# Patient Record
Sex: FEMALE | Race: BLACK OR AFRICAN AMERICAN | NOT HISPANIC OR LATINO | URBAN - METROPOLITAN AREA
[De-identification: names, ages, dates, MRNs, and addresses within clinical notes are randomized per-mention and may not be internally consistent; named-entity substitution may affect disease eponyms.]

---

## 2019-11-13 ENCOUNTER — EMERGENCY (EMERGENCY)
Facility: HOSPITAL | Age: 74
LOS: 1 days | Discharge: HOME | End: 2019-11-13
Attending: EMERGENCY MEDICINE | Admitting: EMERGENCY MEDICINE
Payer: COMMERCIAL

## 2019-11-13 VITALS
HEART RATE: 118 BPM | WEIGHT: 158.95 LBS | OXYGEN SATURATION: 100 % | SYSTOLIC BLOOD PRESSURE: 163 MMHG | RESPIRATION RATE: 18 BRPM | TEMPERATURE: 98 F | HEIGHT: 65 IN | DIASTOLIC BLOOD PRESSURE: 86 MMHG

## 2019-11-13 VITALS
RESPIRATION RATE: 16 BRPM | HEART RATE: 81 BPM | SYSTOLIC BLOOD PRESSURE: 151 MMHG | TEMPERATURE: 98 F | DIASTOLIC BLOOD PRESSURE: 82 MMHG | OXYGEN SATURATION: 100 %

## 2019-11-13 DIAGNOSIS — R10.12 LEFT UPPER QUADRANT PAIN: ICD-10-CM

## 2019-11-13 DIAGNOSIS — R11.0 NAUSEA: ICD-10-CM

## 2019-11-13 LAB
ALBUMIN SERPL ELPH-MCNC: 4.6 G/DL — SIGNIFICANT CHANGE UP (ref 3.3–5)
ALP SERPL-CCNC: 96 U/L — SIGNIFICANT CHANGE UP (ref 40–120)
ALT FLD-CCNC: 31 U/L — SIGNIFICANT CHANGE UP (ref 10–45)
ANION GAP SERPL CALC-SCNC: 13 MMOL/L — SIGNIFICANT CHANGE UP (ref 5–17)
APPEARANCE UR: CLEAR — SIGNIFICANT CHANGE UP
AST SERPL-CCNC: 37 U/L — SIGNIFICANT CHANGE UP (ref 10–40)
BASOPHILS # BLD AUTO: 0.04 K/UL — SIGNIFICANT CHANGE UP (ref 0–0.2)
BASOPHILS NFR BLD AUTO: 0.8 % — SIGNIFICANT CHANGE UP (ref 0–2)
BILIRUB SERPL-MCNC: 0.3 MG/DL — SIGNIFICANT CHANGE UP (ref 0.2–1.2)
BILIRUB UR-MCNC: NEGATIVE — SIGNIFICANT CHANGE UP
BUN SERPL-MCNC: 13 MG/DL — SIGNIFICANT CHANGE UP (ref 7–23)
CALCIUM SERPL-MCNC: 10.4 MG/DL — SIGNIFICANT CHANGE UP (ref 8.4–10.5)
CHLORIDE SERPL-SCNC: 105 MMOL/L — SIGNIFICANT CHANGE UP (ref 96–108)
CO2 SERPL-SCNC: 24 MMOL/L — SIGNIFICANT CHANGE UP (ref 22–31)
COLOR SPEC: YELLOW — SIGNIFICANT CHANGE UP
CREAT SERPL-MCNC: 0.98 MG/DL — SIGNIFICANT CHANGE UP (ref 0.5–1.3)
DIFF PNL FLD: NEGATIVE — SIGNIFICANT CHANGE UP
EOSINOPHIL # BLD AUTO: 0.06 K/UL — SIGNIFICANT CHANGE UP (ref 0–0.5)
EOSINOPHIL NFR BLD AUTO: 1.2 % — SIGNIFICANT CHANGE UP (ref 0–6)
EXTRA BLUE TOP TUBE: SIGNIFICANT CHANGE UP
EXTRA SST TUBE: SIGNIFICANT CHANGE UP
GLUCOSE SERPL-MCNC: 233 MG/DL — HIGH (ref 70–99)
GLUCOSE UR QL: NEGATIVE — SIGNIFICANT CHANGE UP
HCT VFR BLD CALC: 43.4 % — SIGNIFICANT CHANGE UP (ref 34.5–45)
HGB BLD-MCNC: 14.4 G/DL — SIGNIFICANT CHANGE UP (ref 11.5–15.5)
IMM GRANULOCYTES NFR BLD AUTO: 0.2 % — SIGNIFICANT CHANGE UP (ref 0–1.5)
KETONES UR-MCNC: NEGATIVE — SIGNIFICANT CHANGE UP
LACTATE SERPL-SCNC: 1.9 MMOL/L — SIGNIFICANT CHANGE UP (ref 0.5–2)
LACTATE SERPL-SCNC: 3.4 MMOL/L — HIGH (ref 0.5–2)
LEUKOCYTE ESTERASE UR-ACNC: NEGATIVE — SIGNIFICANT CHANGE UP
LIDOCAIN IGE QN: 46 U/L — SIGNIFICANT CHANGE UP (ref 7–60)
LYMPHOCYTES # BLD AUTO: 1.81 K/UL — SIGNIFICANT CHANGE UP (ref 1–3.3)
LYMPHOCYTES # BLD AUTO: 36.1 % — SIGNIFICANT CHANGE UP (ref 13–44)
MCHC RBC-ENTMCNC: 30.8 PG — SIGNIFICANT CHANGE UP (ref 27–34)
MCHC RBC-ENTMCNC: 33.2 GM/DL — SIGNIFICANT CHANGE UP (ref 32–36)
MCV RBC AUTO: 92.9 FL — SIGNIFICANT CHANGE UP (ref 80–100)
MONOCYTES # BLD AUTO: 0.5 K/UL — SIGNIFICANT CHANGE UP (ref 0–0.9)
MONOCYTES NFR BLD AUTO: 10 % — SIGNIFICANT CHANGE UP (ref 2–14)
NEUTROPHILS # BLD AUTO: 2.6 K/UL — SIGNIFICANT CHANGE UP (ref 1.8–7.4)
NEUTROPHILS NFR BLD AUTO: 51.7 % — SIGNIFICANT CHANGE UP (ref 43–77)
NITRITE UR-MCNC: NEGATIVE — SIGNIFICANT CHANGE UP
NRBC # BLD: 0 /100 WBCS — SIGNIFICANT CHANGE UP (ref 0–0)
PH UR: 6.5 — SIGNIFICANT CHANGE UP (ref 5–8)
PLATELET # BLD AUTO: 235 K/UL — SIGNIFICANT CHANGE UP (ref 150–400)
POTASSIUM SERPL-MCNC: 3.6 MMOL/L — SIGNIFICANT CHANGE UP (ref 3.5–5.3)
POTASSIUM SERPL-SCNC: 3.6 MMOL/L — SIGNIFICANT CHANGE UP (ref 3.5–5.3)
PROT SERPL-MCNC: 7.8 G/DL — SIGNIFICANT CHANGE UP (ref 6–8.3)
PROT UR-MCNC: NEGATIVE MG/DL — SIGNIFICANT CHANGE UP
RBC # BLD: 4.67 M/UL — SIGNIFICANT CHANGE UP (ref 3.8–5.2)
RBC # FLD: 13.1 % — SIGNIFICANT CHANGE UP (ref 10.3–14.5)
SODIUM SERPL-SCNC: 142 MMOL/L — SIGNIFICANT CHANGE UP (ref 135–145)
SP GR SPEC: <=1.005 — SIGNIFICANT CHANGE UP (ref 1–1.03)
UROBILINOGEN FLD QL: 0.2 E.U./DL — SIGNIFICANT CHANGE UP
WBC # BLD: 5.02 K/UL — SIGNIFICANT CHANGE UP (ref 3.8–10.5)
WBC # FLD AUTO: 5.02 K/UL — SIGNIFICANT CHANGE UP (ref 3.8–10.5)

## 2019-11-13 PROCEDURE — 93010 ELECTROCARDIOGRAM REPORT: CPT | Mod: NC

## 2019-11-13 PROCEDURE — 99284 EMERGENCY DEPT VISIT MOD MDM: CPT | Mod: 25

## 2019-11-13 PROCEDURE — 71045 X-RAY EXAM CHEST 1 VIEW: CPT | Mod: 26

## 2019-11-13 PROCEDURE — 74177 CT ABD & PELVIS W/CONTRAST: CPT

## 2019-11-13 PROCEDURE — 71045 X-RAY EXAM CHEST 1 VIEW: CPT

## 2019-11-13 PROCEDURE — 36415 COLL VENOUS BLD VENIPUNCTURE: CPT

## 2019-11-13 PROCEDURE — 96374 THER/PROPH/DIAG INJ IV PUSH: CPT | Mod: XU

## 2019-11-13 PROCEDURE — 99284 EMERGENCY DEPT VISIT MOD MDM: CPT

## 2019-11-13 PROCEDURE — 83690 ASSAY OF LIPASE: CPT

## 2019-11-13 PROCEDURE — 87086 URINE CULTURE/COLONY COUNT: CPT

## 2019-11-13 PROCEDURE — 81003 URINALYSIS AUTO W/O SCOPE: CPT

## 2019-11-13 PROCEDURE — 83605 ASSAY OF LACTIC ACID: CPT

## 2019-11-13 PROCEDURE — 74177 CT ABD & PELVIS W/CONTRAST: CPT | Mod: 26

## 2019-11-13 PROCEDURE — 85025 COMPLETE CBC W/AUTO DIFF WBC: CPT

## 2019-11-13 PROCEDURE — 93005 ELECTROCARDIOGRAM TRACING: CPT

## 2019-11-13 PROCEDURE — 80053 COMPREHEN METABOLIC PANEL: CPT

## 2019-11-13 RX ORDER — ONDANSETRON 8 MG/1
4 TABLET, FILM COATED ORAL ONCE
Refills: 0 | Status: COMPLETED | OUTPATIENT
Start: 2019-11-13 | End: 2019-11-13

## 2019-11-13 RX ORDER — ACETAMINOPHEN 500 MG
975 TABLET ORAL ONCE
Refills: 0 | Status: COMPLETED | OUTPATIENT
Start: 2019-11-13 | End: 2019-11-13

## 2019-11-13 RX ORDER — SODIUM CHLORIDE 9 MG/ML
1000 INJECTION INTRAMUSCULAR; INTRAVENOUS; SUBCUTANEOUS ONCE
Refills: 0 | Status: COMPLETED | OUTPATIENT
Start: 2019-11-13 | End: 2019-11-13

## 2019-11-13 RX ORDER — IOHEXOL 300 MG/ML
30 INJECTION, SOLUTION INTRAVENOUS ONCE
Refills: 0 | Status: COMPLETED | OUTPATIENT
Start: 2019-11-13 | End: 2019-11-13

## 2019-11-13 RX ORDER — MORPHINE SULFATE 50 MG/1
2 CAPSULE, EXTENDED RELEASE ORAL ONCE
Refills: 0 | Status: DISCONTINUED | OUTPATIENT
Start: 2019-11-13 | End: 2019-11-13

## 2019-11-13 RX ADMIN — ONDANSETRON 4 MILLIGRAM(S): 8 TABLET, FILM COATED ORAL at 05:21

## 2019-11-13 RX ADMIN — SODIUM CHLORIDE 1000 MILLILITER(S): 9 INJECTION INTRAMUSCULAR; INTRAVENOUS; SUBCUTANEOUS at 05:45

## 2019-11-13 RX ADMIN — Medication 975 MILLIGRAM(S): at 05:46

## 2019-11-13 RX ADMIN — IOHEXOL 30 MILLILITER(S): 300 INJECTION, SOLUTION INTRAVENOUS at 05:21

## 2019-11-13 RX ADMIN — SODIUM CHLORIDE 1000 MILLILITER(S): 9 INJECTION INTRAMUSCULAR; INTRAVENOUS; SUBCUTANEOUS at 05:15

## 2019-11-13 NOTE — ED PROVIDER NOTE - CARE PROVIDERS DIRECT ADDRESSES
[email or web address]

## 2019-11-13 NOTE — ED ADULT NURSE NOTE - OBJECTIVE STATEMENT
Pt presents to ED c/o abdominal pain. Pt reports RUQ/R flank pain, dull/achy, intermittent for months per pt, now also with sharp pain to LUQ for the last two days. Pt also reports 5 lbs weight lose due to decreased appetite, brown discharge in underwear "every once in a while" for months, and sharp pain to vaginal area. Pt also reports "cloudy urine" for a long time, has been seen by PMD but not treated, pt denies painful urination. Pt is s/p total hysterectomy in 2010 for uterine CA at [facility], pt states "they told me they took lymph nodes at that time, they also told me I had benign lesions on my kidney, liver, and lungs at that time." Pt reports she woke up this morning around 1 am, felt anxious and diaphoretic, prompted her to present to ED. Denies f/c, CP/SOB, N/V/D. Pt presents in NAD speaking full sentences ambulatory through triage. Pt notably tachycardic in triage, EKG preformed on arrival to bed 3.

## 2019-11-13 NOTE — ED PROVIDER NOTE - PHYSICAL EXAMINATION
GEN: Well appearing, well nourished, awake, alert, oriented to person, place, time/situation and in no apparent distress. NTAF.  ENT: Airway patent, Nasal mucosa clear. Mouth with normal mucosa.  EYES: Clear bilaterally. perrl, eomi  RESPIRATORY: Breathing comfortably with normal RR. No w.c.r  CARDIAC: Regular rate and rhythm  ABDOMEN: Soft, mild TTp right mid abd and LUQ, +bowel sounds, no rebound, rigidity, or guarding. No CVAT b/l.  MSK: Range of motion is not limited, no deformities noted.  NEURO: Alert and oriented x 3. Cn 2-12 intact. Strength 5/5 and sensation intact in all 4 extremities.   Gait normal.   SKIN: Skin normal color for race, warm, dry and intact. No anxious mood and affect. no apparent risk to self or others.

## 2019-11-13 NOTE — ED PROVIDER NOTE - CLINICAL SUMMARY MEDICAL DECISION MAKING FREE TEXT BOX
73F with a h/o ovarian cancer in the past, s/p total hysterectomy on 2010, at that time she was told she had benign lesions in her liver, kidneys and lungs, who p/w right mid abdominal/flank and LUQ pain, sharp intermittent associated nausea and burping, VSS, afebrile, mild TTp on exam, will check labs, urine, lactate, and CT a/p to r/o obstruction, infection, ischemia, malignancy of other acute abd pathology. Pt refused morphine for pain, requests tylenol, she was also given zofran and IVFs. Initial lactate 3.4, will recheck after 2L IVfs.

## 2019-11-13 NOTE — ED PROVIDER NOTE - CARE PROVIDER_API CALL
Nicholas Gallegos)  Internal Medicine  [address] [place] [address]  Phone: [phone]  Fax: [phone]  Follow Up Time:     Jeff Archer)  Medicine  [address], [address]  [place] [address]  Phone: [phone]  Fax: [phone]  Follow Up Time:

## 2019-11-13 NOTE — ED PROVIDER NOTE - PATIENT PORTAL LINK FT
You can access the FollowMyHealth Patient Portal offered by [facility] by registering at the following website: [email or web address]. By joining [employer]’s FollowMyHealth portal, you will also be able to view your health information using other applications (apps) compatible with our system.

## 2019-11-13 NOTE — ED PROVIDER NOTE - OBJECTIVE STATEMENT
73F with a h/o ovarian cancer in the past, s/p total hysterectomy on 2010, at that time she was told she had benign lesions in her liver, kidneys and lungs, who p/w right mid abdominal/flank and LUQ pain, sharp intermittent associated nausea and burping, she has had this pain for 2 years but notes it has become worse in the past few days no f/c, no vomits, no cp/sob, or urinary sx. pt does endorse 5 lb weight loss recently.

## 2019-11-13 NOTE — ED PROVIDER NOTE - NSFOLLOWUPINSTRUCTIONS_ED_ALL_ED_FT
avoid spicy fried foods, eat blend diet, avoid caffeine and alcohol, take pepcid, follow up with pmd or gastroenterologist, return immediately for any worsening symptoms  Abdominal Pain, Adult  Many things can cause belly (abdominal) pain. Most times, belly pain is not dangerous. Many cases of belly pain can be watched and treated at home. Sometimes belly pain is serious, though. Your doctor will try to find the cause of your belly pain.  Follow these instructions at home:  Take over-the-counter and prescription medicines only as told by your doctor. Do not take medicines that help you poop (laxatives) unless told to by your doctor.Drink enough fluid to keep your pee (urine) clear or pale yellow.Watch your belly pain for any changes.Keep all follow-up visits as told by your doctor. This is important.Contact a doctor if:  Your belly pain changes or gets worse.You are not hungry, or you lose weight without trying.You are having trouble pooping (constipated) or have watery poop (diarrhea) for more than 2–3 days.You have pain when you pee or poop.Your belly pain wakes you up at night.Your pain gets worse with meals, after eating, or with certain foods.You are throwing up and cannot keep anything down.You have a fever.Get help right away if:  Your pain does not go away as soon as your doctor says it should.You cannot stop throwing up.Your pain is only in areas of your belly, such as the right side or the left lower part of the belly.You have bloody or black poop, or poop that looks like tar.You have very bad pain, cramping, or bloating in your belly.You have signs of not having enough fluid or water in your body (dehydration), such as:  Dark pee, very little pee, or no pee.Cracked lips.Dry mouth.Sunken eyes.Sleepiness.Weakness.

## 2019-11-13 NOTE — ED PROVIDER NOTE - PROGRESS NOTE DETAILS
valdo: pt received at sign out from dr martines as pending ct - scan neg for acute pathology, pt nad, sleeping comfortably, lactate neg after ivf, will dc to f/u w/pmd and gi, pt understands and agrees w/plan

## 2019-11-13 NOTE — ED ADULT TRIAGE NOTE - CHIEF COMPLAINT QUOTE
right flank and epigastric pain x2 years, worst today; hx of "benign lesions" on kidneys, liver and lungs

## 2019-11-13 NOTE — ED ADULT NURSE NOTE - NSIMPLEMENTINTERV_GEN_ALL_ED
Implemented All Universal Safety Interventions:  Levels to call system. Call bell, personal items and telephone within reach. Instruct patient to call for assistance. Room bathroom lighting operational. Non-slip footwear when patient is off stretcher. Physically safe environment: no spills, clutter or unnecessary equipment. Stretcher in lowest position, wheels locked, appropriate side rails in place.

## 2019-11-14 LAB
CULTURE RESULTS: SIGNIFICANT CHANGE UP
SPECIMEN SOURCE: SIGNIFICANT CHANGE UP

## 2021-07-08 ENCOUNTER — APPOINTMENT (OUTPATIENT)
Dept: PLASTIC SURGERY | Facility: CLINIC | Age: 76
End: 2021-07-08

## 2021-07-08 ENCOUNTER — OUTPATIENT (OUTPATIENT)
Dept: OUTPATIENT SERVICES | Facility: HOSPITAL | Age: 76
LOS: 1 days | End: 2021-07-08

## 2021-07-08 DIAGNOSIS — Z86.79 PERSONAL HISTORY OF OTHER DISEASES OF THE CIRCULATORY SYSTEM: ICD-10-CM

## 2021-07-08 NOTE — ASSESSMENT
[FreeTextEntry1] : 75F interested in liposuction and correction of buttock ptosis.\par - Explained to patient that liposuction would not address abdominal contour and may leave the patient with sagging/excess skin.  As there is no appreciable rectus diastasis, this abdominal contour is likely related to visceral adiposity which is unlikely to be corrected with liposuction or revision abdominoplasty \par - Buttock ptosis is due to volume loss and loss of skin elasticity.  this could possibly be addressed with fat transfer, but patient has poor donor sites to enable harvesting of sufficient fat to correct this degree of buttock ptosis\par \par Patient was explained all the above and agrees with the assessment and plan. Patient invited to call back or make another appointment

## 2021-07-08 NOTE — HISTORY OF PRESENT ILLNESS
[FreeTextEntry1] : 75F PMH hypertension (on 3 antihypertensives), uterine CA s/p ARPITA 2010, abdominoplasty 20 years prior presents with concern for abdominal contour and buttock ptosis.  Patient expresses concern for supraumbilical fullness and left buttock ptosis > right

## 2021-07-08 NOTE — PHYSICAL EXAM
[NI] : Normal [de-identified] : Well-appearing [de-identified] : Well-healed abdominoplasty scar.  No palpable rectus diastasis.  Mild adiposity above umbilicus; however majority of abdominal contour appears to be due to visceral adiposity; skin pinch thickness is ~2-3cm [de-identified] : Left buttock with greater deflation and ptosis compared to right.  Striae visible on bilateral buttocks.  No scars visible.  Area of ptosis and deflation on the left are superior to the gluteal fold

## 2021-07-09 DIAGNOSIS — Z01.89 ENCOUNTER FOR OTHER SPECIFIED SPECIAL EXAMINATIONS: ICD-10-CM

## 2021-11-04 ENCOUNTER — NON-APPOINTMENT (OUTPATIENT)
Age: 76
End: 2021-11-04

## 2021-11-04 ENCOUNTER — APPOINTMENT (OUTPATIENT)
Dept: HEART AND VASCULAR | Facility: CLINIC | Age: 76
End: 2021-11-04
Payer: MEDICARE

## 2021-11-04 VITALS
OXYGEN SATURATION: 98 % | TEMPERATURE: 97.5 F | WEIGHT: 152 LBS | HEIGHT: 64 IN | SYSTOLIC BLOOD PRESSURE: 132 MMHG | HEART RATE: 63 BPM | BODY MASS INDEX: 25.95 KG/M2 | DIASTOLIC BLOOD PRESSURE: 80 MMHG

## 2021-11-04 DIAGNOSIS — E66.3 OVERWEIGHT: ICD-10-CM

## 2021-11-04 DIAGNOSIS — M25.519 PAIN IN UNSPECIFIED SHOULDER: ICD-10-CM

## 2021-11-04 DIAGNOSIS — I10 ESSENTIAL (PRIMARY) HYPERTENSION: ICD-10-CM

## 2021-11-04 DIAGNOSIS — Z78.9 OTHER SPECIFIED HEALTH STATUS: ICD-10-CM

## 2021-11-04 DIAGNOSIS — Z85.42 PERSONAL HISTORY OF MALIGNANT NEOPLASM OF OTHER PARTS OF UTERUS: ICD-10-CM

## 2021-11-04 DIAGNOSIS — R73.03 PREDIABETES.: ICD-10-CM

## 2021-11-04 PROCEDURE — 36415 COLL VENOUS BLD VENIPUNCTURE: CPT

## 2021-11-04 PROCEDURE — 99205 OFFICE O/P NEW HI 60 MIN: CPT | Mod: 25

## 2021-11-04 PROCEDURE — 93000 ELECTROCARDIOGRAM COMPLETE: CPT

## 2021-11-04 RX ORDER — CHROMIUM 200 MCG
TABLET ORAL
Refills: 0 | Status: ACTIVE | COMMUNITY

## 2021-11-04 RX ORDER — NIFEDIPINE 30 MG/1
30 TABLET, EXTENDED RELEASE ORAL
Refills: 0 | Status: ACTIVE | COMMUNITY

## 2021-11-04 RX ORDER — METOPROLOL SUCCINATE 50 MG/1
50 TABLET, EXTENDED RELEASE ORAL
Refills: 0 | Status: ACTIVE | COMMUNITY

## 2021-11-04 RX ORDER — LISINOPRIL 20 MG/1
20 TABLET ORAL
Refills: 0 | Status: ACTIVE | COMMUNITY

## 2021-11-04 RX ORDER — MULTIVIT-MIN/IRON/FOLIC ACID/K 18-600-40
CAPSULE ORAL
Refills: 0 | Status: ACTIVE | COMMUNITY

## 2021-11-04 NOTE — HISTORY OF PRESENT ILLNESS
[FreeTextEntry1] : \par \par 76 y/o female with h/o htn, uterine ca s/p ARPITA/chemo/xrt, predm, overweight who presents for initial evaluation today of new shoulder pain\par \par no cp\par notes sharp pain in shoulder/arm/hand a few months ago \par no sob, orthopnea, pnd, syncope, palpitations, edema, fatigue \par \par \par had chemo x 6 sessions post hysterectomy\par \par \par walk daily\par diet healthy\par lost 10 lbs\par HTN diagnosed in 40's\par \par no pregnancy complications\par no mental health issues\par \par \par PMH/PSH:\par predm\par htn\par uterine ca s/p ARPITA  - chemo/radiation\par abdominoplasty \par overweight\par \par \par MEDS:\par metoprolol succinate 50 mg qd\par nifedipine 30 mg qd\par lisinopril 20 mg qd\par vit C/D\par \par SH:\par no tobacco\par social etoh\par no drugs\par 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

## 2021-11-04 NOTE — PHYSICAL EXAM
[Well Developed] : well developed [Well Nourished] : well nourished [No Acute Distress] : no acute distress [Normal Conjunctiva] : normal conjunctiva [Normal Venous Pressure] : normal venous pressure [No Carotid Bruit] : no carotid bruit [Normal S1, S2] : normal S1, S2 [No Rub] : no rub [No Gallop] : no gallop [Clear Lung Fields] : clear lung fields [Good Air Entry] : good air entry [No Respiratory Distress] : no respiratory distress  [Soft] : abdomen soft [Non Tender] : non-tender [No Masses/organomegaly] : no masses/organomegaly [Normal Bowel Sounds] : normal bowel sounds [Normal Gait] : normal gait [No Edema] : no edema [No Cyanosis] : no cyanosis [No Clubbing] : no clubbing [No Varicosities] : no varicosities [No Rash] : no rash [No Skin Lesions] : no skin lesions [Moves all extremities] : moves all extremities [No Focal Deficits] : no focal deficits [Normal Speech] : normal speech [Alert and Oriented] : alert and oriented [Normal memory] : normal memory [de-identified] : 2/6 flex gilbert

## 2021-11-04 NOTE — DISCUSSION/SUMMARY
[Patient] : the patient [___ Month(s)] : in [unfilled] month(s) [FreeTextEntry1] : \par \par 76 y/o female with h/o htn, predm, uterine ca s/p ARPITA/chemo/xrt, overweight who presents for initial evaluation today of new shoulder pain\par \par -ordered ekg today - nsr, lad, possible lvh, no st/t changes\par -labs ordered today\par -ordered CTA given h/o chemo, new left shoulder pain - consider asa, statin pending results\par -ordered Echo for shoulder pain, h/o chemo and murmur\par -continue BB, CCB, ACE \par -counseled on cvd risk factors\par -monitor A1c for angelica\par -referral to pcp\par -f/up 1-2 months for shoulder pain, htn, cvd risk factors\par \par I have spent 60 minutes reviewing labs, records, tests and discussed cvd risk factors, htn, shoulder appt \par \par

## 2021-11-08 LAB
ALBUMIN SERPL ELPH-MCNC: 4.9 G/DL
ALP BLD-CCNC: 100 U/L
ALT SERPL-CCNC: 36 U/L
ANION GAP SERPL CALC-SCNC: 18 MMOL/L
APPEARANCE: ABNORMAL
AST SERPL-CCNC: 34 U/L
BACTERIA: NEGATIVE
BASOPHILS # BLD AUTO: 0.04 K/UL
BASOPHILS NFR BLD AUTO: 0.7 %
BILIRUB SERPL-MCNC: 0.2 MG/DL
BILIRUBIN URINE: NEGATIVE
BLOOD URINE: NEGATIVE
BUN SERPL-MCNC: 17 MG/DL
CALCIUM OXALATE CRYSTALS: ABNORMAL
CALCIUM SERPL-MCNC: 11 MG/DL
CHLORIDE SERPL-SCNC: 107 MMOL/L
CHOLEST SERPL-MCNC: 233 MG/DL
CO2 SERPL-SCNC: 20 MMOL/L
COLOR: YELLOW
CREAT SERPL-MCNC: 1.05 MG/DL
CREAT SPEC-SCNC: 217 MG/DL
EOSINOPHIL # BLD AUTO: 0.08 K/UL
EOSINOPHIL NFR BLD AUTO: 1.5 %
ESTIMATED AVERAGE GLUCOSE: 120 MG/DL
GLUCOSE QUALITATIVE U: NEGATIVE
GLUCOSE SERPL-MCNC: 95 MG/DL
HBA1C MFR BLD HPLC: 5.8 %
HCT VFR BLD CALC: 45.9 %
HDLC SERPL-MCNC: 68 MG/DL
HGB BLD-MCNC: 15 G/DL
HYALINE CASTS: 2 /LPF
IMM GRANULOCYTES NFR BLD AUTO: 0.2 %
KETONES URINE: NEGATIVE
LDLC SERPL CALC-MCNC: 142 MG/DL
LEUKOCYTE ESTERASE URINE: NEGATIVE
LYMPHOCYTES # BLD AUTO: 2.69 K/UL
LYMPHOCYTES NFR BLD AUTO: 48.8 %
MAGNESIUM SERPL-MCNC: 2.2 MG/DL
MAN DIFF?: NORMAL
MCHC RBC-ENTMCNC: 31.9 PG
MCHC RBC-ENTMCNC: 32.7 GM/DL
MCV RBC AUTO: 97.7 FL
MICROALBUMIN 24H UR DL<=1MG/L-MCNC: 1.5 MG/DL
MICROALBUMIN/CREAT 24H UR-RTO: 7 MG/G
MICROSCOPIC-UA: NORMAL
MONOCYTES # BLD AUTO: 0.5 K/UL
MONOCYTES NFR BLD AUTO: 9.1 %
NEUTROPHILS # BLD AUTO: 2.19 K/UL
NEUTROPHILS NFR BLD AUTO: 39.7 %
NITRITE URINE: NEGATIVE
NONHDLC SERPL-MCNC: 165 MG/DL
PH URINE: 5.5
PLATELET # BLD AUTO: 264 K/UL
POTASSIUM SERPL-SCNC: 4.7 MMOL/L
PROT SERPL-MCNC: 7.8 G/DL
PROTEIN URINE: NEGATIVE
RBC # BLD: 4.7 M/UL
RBC # FLD: 14.2 %
RED BLOOD CELLS URINE: 3 /HPF
SODIUM SERPL-SCNC: 145 MMOL/L
SPECIFIC GRAVITY URINE: 1.02
SQUAMOUS EPITHELIAL CELLS: 11 /HPF
TRIGL SERPL-MCNC: 115 MG/DL
TSH SERPL-ACNC: 0.85 UIU/ML
UROBILINOGEN URINE: NORMAL
WBC # FLD AUTO: 5.51 K/UL
WHITE BLOOD CELLS URINE: 2 /HPF

## 2021-11-15 ENCOUNTER — INPATIENT (INPATIENT)
Facility: HOSPITAL | Age: 76
LOS: 0 days | Discharge: HOME | DRG: 313 | End: 2021-11-16
Attending: INTERNAL MEDICINE | Admitting: INTERNAL MEDICINE
Payer: MEDICARE

## 2021-11-15 VITALS
HEART RATE: 73 BPM | DIASTOLIC BLOOD PRESSURE: 81 MMHG | TEMPERATURE: 98 F | SYSTOLIC BLOOD PRESSURE: 148 MMHG | HEIGHT: 65 IN | WEIGHT: 164.91 LBS | RESPIRATION RATE: 18 BRPM | OXYGEN SATURATION: 99 %

## 2021-11-15 LAB
ALBUMIN SERPL ELPH-MCNC: 4.5 G/DL — SIGNIFICANT CHANGE UP (ref 3.3–5)
ALP SERPL-CCNC: 101 U/L — SIGNIFICANT CHANGE UP (ref 40–120)
ALT FLD-CCNC: 26 U/L — SIGNIFICANT CHANGE UP (ref 10–45)
ANION GAP SERPL CALC-SCNC: 13 MMOL/L — SIGNIFICANT CHANGE UP (ref 5–17)
APTT BLD: 29.9 SEC — SIGNIFICANT CHANGE UP (ref 27.5–35.5)
AST SERPL-CCNC: 37 U/L — SIGNIFICANT CHANGE UP (ref 10–40)
BASOPHILS # BLD AUTO: 0.04 K/UL — SIGNIFICANT CHANGE UP (ref 0–0.2)
BASOPHILS NFR BLD AUTO: 0.7 % — SIGNIFICANT CHANGE UP (ref 0–2)
BILIRUB SERPL-MCNC: 0.2 MG/DL — SIGNIFICANT CHANGE UP (ref 0.2–1.2)
BUN SERPL-MCNC: 17 MG/DL — SIGNIFICANT CHANGE UP (ref 7–23)
CALCIUM SERPL-MCNC: 10.4 MG/DL — SIGNIFICANT CHANGE UP (ref 8.4–10.5)
CHLORIDE SERPL-SCNC: 105 MMOL/L — SIGNIFICANT CHANGE UP (ref 96–108)
CK MB CFR SERPL CALC: 1.4 NG/ML — SIGNIFICANT CHANGE UP (ref 0–6.7)
CK SERPL-CCNC: 101 U/L — SIGNIFICANT CHANGE UP (ref 25–170)
CO2 SERPL-SCNC: 24 MMOL/L — SIGNIFICANT CHANGE UP (ref 22–31)
CREAT SERPL-MCNC: 1.13 MG/DL — SIGNIFICANT CHANGE UP (ref 0.5–1.3)
EOSINOPHIL # BLD AUTO: 0.09 K/UL — SIGNIFICANT CHANGE UP (ref 0–0.5)
EOSINOPHIL NFR BLD AUTO: 1.6 % — SIGNIFICANT CHANGE UP (ref 0–6)
GLUCOSE SERPL-MCNC: 116 MG/DL — HIGH (ref 70–99)
HCT VFR BLD CALC: 41.9 % — SIGNIFICANT CHANGE UP (ref 34.5–45)
HGB BLD-MCNC: 13.8 G/DL — SIGNIFICANT CHANGE UP (ref 11.5–15.5)
IMM GRANULOCYTES NFR BLD AUTO: 0.2 % — SIGNIFICANT CHANGE UP (ref 0–1.5)
INR BLD: 0.93 — SIGNIFICANT CHANGE UP (ref 0.88–1.16)
LYMPHOCYTES # BLD AUTO: 2.92 K/UL — SIGNIFICANT CHANGE UP (ref 1–3.3)
LYMPHOCYTES # BLD AUTO: 51 % — HIGH (ref 13–44)
MCHC RBC-ENTMCNC: 31.4 PG — SIGNIFICANT CHANGE UP (ref 27–34)
MCHC RBC-ENTMCNC: 32.9 GM/DL — SIGNIFICANT CHANGE UP (ref 32–36)
MCV RBC AUTO: 95.4 FL — SIGNIFICANT CHANGE UP (ref 80–100)
MONOCYTES # BLD AUTO: 0.64 K/UL — SIGNIFICANT CHANGE UP (ref 0–0.9)
MONOCYTES NFR BLD AUTO: 11.2 % — SIGNIFICANT CHANGE UP (ref 2–14)
NEUTROPHILS # BLD AUTO: 2.02 K/UL — SIGNIFICANT CHANGE UP (ref 1.8–7.4)
NEUTROPHILS NFR BLD AUTO: 35.3 % — LOW (ref 43–77)
NRBC # BLD: 0 /100 WBCS — SIGNIFICANT CHANGE UP (ref 0–0)
NT-PROBNP SERPL-SCNC: 38 PG/ML — SIGNIFICANT CHANGE UP (ref 0–300)
PLATELET # BLD AUTO: 236 K/UL — SIGNIFICANT CHANGE UP (ref 150–400)
POTASSIUM SERPL-MCNC: 4.2 MMOL/L — SIGNIFICANT CHANGE UP (ref 3.5–5.3)
POTASSIUM SERPL-SCNC: 4.2 MMOL/L — SIGNIFICANT CHANGE UP (ref 3.5–5.3)
PROT SERPL-MCNC: 7.4 G/DL — SIGNIFICANT CHANGE UP (ref 6–8.3)
PROTHROM AB SERPL-ACNC: 11.2 SEC — SIGNIFICANT CHANGE UP (ref 10.6–13.6)
RBC # BLD: 4.39 M/UL — SIGNIFICANT CHANGE UP (ref 3.8–5.2)
RBC # FLD: 13.6 % — SIGNIFICANT CHANGE UP (ref 10.3–14.5)
SARS-COV-2 RNA SPEC QL NAA+PROBE: NEGATIVE — SIGNIFICANT CHANGE UP
SODIUM SERPL-SCNC: 142 MMOL/L — SIGNIFICANT CHANGE UP (ref 135–145)
TROPONIN T SERPL-MCNC: 0.01 NG/ML — SIGNIFICANT CHANGE UP (ref 0–0.01)
WBC # BLD: 5.72 K/UL — SIGNIFICANT CHANGE UP (ref 3.8–10.5)
WBC # FLD AUTO: 5.72 K/UL — SIGNIFICANT CHANGE UP (ref 3.8–10.5)

## 2021-11-15 PROCEDURE — 99285 EMERGENCY DEPT VISIT HI MDM: CPT | Mod: 25

## 2021-11-15 PROCEDURE — 71045 X-RAY EXAM CHEST 1 VIEW: CPT | Mod: 26

## 2021-11-15 PROCEDURE — 93010 ELECTROCARDIOGRAM REPORT: CPT

## 2021-11-15 NOTE — ED ADULT NURSE NOTE - CHIEF COMPLAINT QUOTE
pt co palpitations since this AM. Seen by cardiologist 2 weeks ago and dx with murmur and referred for echo but has not had one yet. Denies Cp, SOB. EKG in prog.

## 2021-11-15 NOTE — ED PROVIDER NOTE - OBJECTIVE STATEMENT
75F with a h/o HTN, HLD, GERD who p/w left sided chest pain off and on associated with some intermittent palpitations since this morning. She noticed that her HR was in the 80s on her home BP monitor and reports it is usually in the 60s. She saw her cardiologist 2 weeks ago who noticed a murmur and recommended "additional tests" which she had not had a chance to have done. She currently denies CP, No f/c, no sob, no leg swelling, no n/v, no abd pain, no ha or neck pain, no dizziness or syncope, no n/t/w in ext. No other complaints.

## 2021-11-15 NOTE — ED ADULT TRIAGE NOTE - CHIEF COMPLAINT QUOTE
Your 18 Month Old  Next Visit:  Next visit: When your child is 1 years old    Here are some tips to help keep your child healthy, safe and happy!  The Department of Health recommends your child see a dentist yearly.  If your child has not received fluoride dental varnish to help prevent early cavities ask your provider about it.   Feeding:  Your child should be off the bottle now.  If your child needs some comfort to get to sleep, let them use a cuddly toy, blanket, or thumb, but not a bottle.   Your toddler should be eating three meals a day, plus one or two healthy snacks.  Are you and your child on WIC (Women, Infants and Children)?  Call to see if you qualify for free food or formula.  Call [facility] at [phone] ([facility]) or [phone] ([facility]).  Safety:  Your child should be in a rear-facing car seat until the age of 2 or until your child reaches the highest weight or height allowed by the car seat s . The car seat should be properly installed in the back seat of all vehicles for every ride.  Some toddlers can unbuckle car seat straps.  Do not start the car until everyone in the car has buckled their seatbelts and stop if your toddler unbuckles.  Constant supervision is necessary.  Your toddler is curious and creative.  Keep your child s environment safe by using safety plugs in all unused electrical outlets so your child can't stick their finger or a toy into the holes.  Also use outlet covers that can fit over plugged-in cords. Place vences at the top and bottom of staircases and guards on windows on the second floor or higher.  Lock away all poisons, cleaning products and medications. Call Poison Help (1-692.747.5167) if you are concerned your child has eaten something harmful.  Have working smoke detectors on every floor. Change the batteries once a year and check to see that it works once a month.    Home Life:  Protect your child from smoke.  If someone in your house is  smoking, your child is smoking too.  Do not allow anyone to smoke in your home.  Don't leave your child with a caretaker who smokes.  Toddlers are rarely ready for toilet training before they are 2 years old.  Some signs that a child may be ready are:     bowel movements occur on a predictable schedule    the diaper is dry for 2 hours     can and will follow instructions     shows an interest in imitating other family members in the bathroom    can tell when their bladder is full or when they are about to have a bowel movement              Help your child brush their teeth at least once a day, ideally at bedtime.  Use a soft nylon-bristle brush.  Use only a small amount of toothpaste with fluoride.    It is best to set rules for screen time (TV/computer/phone) when your child is young.  Some suggestions are:    Turn the TV on for certain programs and then turn it off again.  Don't leave it turned on all the time.     Pick educational programs right for your child's age.      Avoid using screen time as a .      Set clear screen time limits.  Encourage your child to do other activities.    Call Early Childhood Family Education [phone] ([place])/[phone] ([place]) or your local school district for information about classes and groups for parents and children.  Development:  Most children at 18 months can:    put simple clothing on and off     roll a ball back and forth    scribble with a crayon    speak about 15 words    run well       walk upstairs by holding a rail  Give your child:    chances to run, climb and explore    picture books - and read them to your child    toys to put together    praise, hugs, affection  Updated 3/2018   i   pt co palpitations since this AM. Seen by cardiologist 2 weeks ago and dx with murmur and referred for echo but has not had one yet. Denies Cp, SOB. EKG in prog.

## 2021-11-15 NOTE — ED PROVIDER NOTE - CLINICAL SUMMARY MEDICAL DECISION MAKING FREE TEXT BOX
75F with a h/o HTN, HLD, GERD who p/w left sided chest pain off and on associated with some intermittent palpitations since this morning. She noticed that her HR was in the 80s on her home BP monitor and reports it is usually in the 60s. She saw her cardiologist 2 weeks ago who noticed a murmur and recommended "additional tests" which she had not had a chance to have done. She currently denies CP, No f/c, no sob, no leg swelling, no n/v, no abd pain, no ha or neck pain, no dizziness or syncope, no n/t/w in ext. No other complaints.  Pt is well-appearing on exam, VSS, no focal neuro deficits, EKG NSR, no ischemia   Plan for cardiac labs, DD, CXR, anticipate admit for r/o acs.

## 2021-11-15 NOTE — ED ADULT NURSE NOTE - CHPI ED NUR SYMPTOMS NEG
no back pain/no chest pain/no congestion/no diaphoresis/no dizziness/no fever/no nausea/no shortness of breath/no vomiting

## 2021-11-15 NOTE — ED PROVIDER NOTE - PHYSICAL EXAMINATION
GEN: Well appearing, well developed, awake, alert, oriented to person, place, time/situation and in no apparent distress. NTAF  ENT: Airway patent, Nasal mucosa clear. Mouth with normal mucosa.  EYES: Clear bilaterally. PERRL, EOMI  RESPIRATORY: Breathing comfortably with normal RR. No W/C/R, no hypoxia or resp distress.  CARDIAC: Regular rate and rhythm, +ENZO  ABDOMEN: Soft, nontender, +bowel sounds, no rebound, rigidity, or guarding.  MSK: Range of motion is not limited, no deformities noted.  NEURO: Alert and oriented, no focal deficits.  SKIN: Skin normal color for race, warm, dry and intact. No evidence of rash.  PSYCH: Alert and oriented to person, place, time/situation. normal mood and affect. no apparent risk to self or others.

## 2021-11-16 ENCOUNTER — TRANSCRIPTION ENCOUNTER (OUTPATIENT)
Age: 76
End: 2021-11-16

## 2021-11-16 VITALS
RESPIRATION RATE: 17 BRPM | HEART RATE: 57 BPM | SYSTOLIC BLOOD PRESSURE: 130 MMHG | TEMPERATURE: 99 F | OXYGEN SATURATION: 100 % | DIASTOLIC BLOOD PRESSURE: 68 MMHG

## 2021-11-16 DIAGNOSIS — I10 ESSENTIAL (PRIMARY) HYPERTENSION: ICD-10-CM

## 2021-11-16 DIAGNOSIS — Z29.9 ENCOUNTER FOR PROPHYLACTIC MEASURES, UNSPECIFIED: ICD-10-CM

## 2021-11-16 DIAGNOSIS — R07.9 CHEST PAIN, UNSPECIFIED: ICD-10-CM

## 2021-11-16 DIAGNOSIS — E11.9 TYPE 2 DIABETES MELLITUS WITHOUT COMPLICATIONS: ICD-10-CM

## 2021-11-16 DIAGNOSIS — E78.5 HYPERLIPIDEMIA, UNSPECIFIED: ICD-10-CM

## 2021-11-16 DIAGNOSIS — R00.2 PALPITATIONS: ICD-10-CM

## 2021-11-16 DIAGNOSIS — K21.9 GASTRO-ESOPHAGEAL REFLUX DISEASE WITHOUT ESOPHAGITIS: ICD-10-CM

## 2021-11-16 LAB
A1C WITH ESTIMATED AVERAGE GLUCOSE RESULT: 5.7 % — HIGH (ref 4–5.6)
ANION GAP SERPL CALC-SCNC: 9 MMOL/L — SIGNIFICANT CHANGE UP (ref 5–17)
BUN SERPL-MCNC: 12 MG/DL — SIGNIFICANT CHANGE UP (ref 7–23)
CALCIUM SERPL-MCNC: 9.9 MG/DL — SIGNIFICANT CHANGE UP (ref 8.4–10.5)
CHLORIDE SERPL-SCNC: 107 MMOL/L — SIGNIFICANT CHANGE UP (ref 96–108)
CHOLEST SERPL-MCNC: 204 MG/DL — HIGH
CK SERPL-CCNC: 96 U/L — SIGNIFICANT CHANGE UP (ref 25–170)
CO2 SERPL-SCNC: 26 MMOL/L — SIGNIFICANT CHANGE UP (ref 22–31)
COVID-19 SPIKE DOMAIN AB INTERP: POSITIVE
COVID-19 SPIKE DOMAIN ANTIBODY RESULT: >250 U/ML — HIGH
CREAT SERPL-MCNC: 0.91 MG/DL — SIGNIFICANT CHANGE UP (ref 0.5–1.3)
ESTIMATED AVERAGE GLUCOSE: 117 MG/DL — HIGH (ref 68–114)
GLUCOSE SERPL-MCNC: 110 MG/DL — HIGH (ref 70–99)
HCT VFR BLD CALC: 42.3 % — SIGNIFICANT CHANGE UP (ref 34.5–45)
HDLC SERPL-MCNC: 67 MG/DL — SIGNIFICANT CHANGE UP
HGB BLD-MCNC: 14 G/DL — SIGNIFICANT CHANGE UP (ref 11.5–15.5)
LIPID PNL WITH DIRECT LDL SERPL: 122 MG/DL — HIGH
MAGNESIUM SERPL-MCNC: 2.2 MG/DL — SIGNIFICANT CHANGE UP (ref 1.6–2.6)
MCHC RBC-ENTMCNC: 31.1 PG — SIGNIFICANT CHANGE UP (ref 27–34)
MCHC RBC-ENTMCNC: 33.1 GM/DL — SIGNIFICANT CHANGE UP (ref 32–36)
MCV RBC AUTO: 94 FL — SIGNIFICANT CHANGE UP (ref 80–100)
NON HDL CHOLESTEROL: 137 MG/DL — HIGH
NRBC # BLD: 0 /100 WBCS — SIGNIFICANT CHANGE UP (ref 0–0)
PHOSPHATE SERPL-MCNC: 3.5 MG/DL — SIGNIFICANT CHANGE UP (ref 2.5–4.5)
PLATELET # BLD AUTO: 202 K/UL — SIGNIFICANT CHANGE UP (ref 150–400)
POTASSIUM SERPL-MCNC: 4.3 MMOL/L — SIGNIFICANT CHANGE UP (ref 3.5–5.3)
POTASSIUM SERPL-SCNC: 4.3 MMOL/L — SIGNIFICANT CHANGE UP (ref 3.5–5.3)
RBC # BLD: 4.5 M/UL — SIGNIFICANT CHANGE UP (ref 3.8–5.2)
RBC # FLD: 13.6 % — SIGNIFICANT CHANGE UP (ref 10.3–14.5)
SARS-COV-2 IGG+IGM SERPL QL IA: >250 U/ML — HIGH
SARS-COV-2 IGG+IGM SERPL QL IA: POSITIVE
SODIUM SERPL-SCNC: 142 MMOL/L — SIGNIFICANT CHANGE UP (ref 135–145)
TRIGL SERPL-MCNC: 74 MG/DL — SIGNIFICANT CHANGE UP
TROPONIN T SERPL-MCNC: 0.01 NG/ML — SIGNIFICANT CHANGE UP (ref 0–0.01)
TSH SERPL-MCNC: 1.29 UIU/ML — SIGNIFICANT CHANGE UP (ref 0.27–4.2)
WBC # BLD: 4.54 K/UL — SIGNIFICANT CHANGE UP (ref 3.8–10.5)
WBC # FLD AUTO: 4.54 K/UL — SIGNIFICANT CHANGE UP (ref 3.8–10.5)

## 2021-11-16 PROCEDURE — 78452 HT MUSCLE IMAGE SPECT MULT: CPT | Mod: 26

## 2021-11-16 PROCEDURE — 93018 CV STRESS TEST I&R ONLY: CPT

## 2021-11-16 PROCEDURE — 93306 TTE W/DOPPLER COMPLETE: CPT | Mod: 26

## 2021-11-16 PROCEDURE — 93016 CV STRESS TEST SUPVJ ONLY: CPT

## 2021-11-16 PROCEDURE — 99239 HOSP IP/OBS DSCHRG MGMT >30: CPT

## 2021-11-16 RX ORDER — NIFEDIPINE 30 MG
1 TABLET, EXTENDED RELEASE 24 HR ORAL
Qty: 0 | Refills: 0 | DISCHARGE

## 2021-11-16 RX ORDER — LISINOPRIL 2.5 MG/1
1 TABLET ORAL
Qty: 0 | Refills: 0 | DISCHARGE

## 2021-11-16 RX ORDER — NIFEDIPINE 30 MG
30 TABLET, EXTENDED RELEASE 24 HR ORAL DAILY
Refills: 0 | Status: DISCONTINUED | OUTPATIENT
Start: 2021-11-16 | End: 2021-11-16

## 2021-11-16 RX ORDER — FAMOTIDINE 10 MG/ML
20 INJECTION INTRAVENOUS DAILY
Refills: 0 | Status: DISCONTINUED | OUTPATIENT
Start: 2021-11-16 | End: 2021-11-16

## 2021-11-16 RX ORDER — METOPROLOL TARTRATE 50 MG
1 TABLET ORAL
Qty: 0 | Refills: 0 | DISCHARGE

## 2021-11-16 RX ORDER — INFLUENZA VIRUS VACCINE 15; 15; 15; 15 UG/.5ML; UG/.5ML; UG/.5ML; UG/.5ML
0.7 SUSPENSION INTRAMUSCULAR ONCE
Refills: 0 | Status: COMPLETED | OUTPATIENT
Start: 2021-11-16 | End: 2021-11-16

## 2021-11-16 RX ORDER — ASPIRIN/CALCIUM CARB/MAGNESIUM 324 MG
81 TABLET ORAL DAILY
Refills: 0 | Status: DISCONTINUED | OUTPATIENT
Start: 2021-11-16 | End: 2021-11-16

## 2021-11-16 RX ORDER — LISINOPRIL 2.5 MG/1
20 TABLET ORAL DAILY
Refills: 0 | Status: DISCONTINUED | OUTPATIENT
Start: 2021-11-16 | End: 2021-11-16

## 2021-11-16 RX ORDER — METOPROLOL TARTRATE 50 MG
50 TABLET ORAL DAILY
Refills: 0 | Status: DISCONTINUED | OUTPATIENT
Start: 2021-11-16 | End: 2021-11-16

## 2021-11-16 RX ORDER — ERGOCALCIFEROL 1.25 MG/1
1 CAPSULE ORAL
Qty: 0 | Refills: 0 | DISCHARGE

## 2021-11-16 RX ORDER — ACETAMINOPHEN 500 MG
650 TABLET ORAL EVERY 6 HOURS
Refills: 0 | Status: DISCONTINUED | OUTPATIENT
Start: 2021-11-16 | End: 2021-11-16

## 2021-11-16 RX ADMIN — Medication 30 MILLIGRAM(S): at 06:00

## 2021-11-16 RX ADMIN — LISINOPRIL 20 MILLIGRAM(S): 2.5 TABLET ORAL at 06:00

## 2021-11-16 RX ADMIN — FAMOTIDINE 20 MILLIGRAM(S): 10 INJECTION INTRAVENOUS at 15:48

## 2021-11-16 RX ADMIN — Medication 50 MILLIGRAM(S): at 06:01

## 2021-11-16 RX ADMIN — Medication 81 MILLIGRAM(S): at 15:48

## 2021-11-16 NOTE — H&P ADULT - PROBLEM SELECTOR PLAN 1
Admit for tele monitoring, serial enzymes. Echo ordered. NPO for further testing as clinically indicated.  Continue BB, start ASA as tolerated, consider statin.

## 2021-11-16 NOTE — H&P ADULT - NSICDXPASTMEDICALHX_GEN_ALL_CORE_FT
PAST MEDICAL HISTORY:  Diabetes mellitus, type 2     Dyslipidemia     GERD (gastroesophageal reflux disease)     Hypertension     Uterine cancer s/p hysto/xrt/chemo

## 2021-11-16 NOTE — DISCHARGE NOTE PROVIDER - NSDCCPCAREPLAN_GEN_ALL_CORE_FT
PRINCIPAL DISCHARGE DIAGNOSIS  Diagnosis: Chest pain  Assessment and Plan of Treatment: The chest pain you experienced was not due to your heart. You did NOT have a heart attack.  Your labwork was normal. You had a sonogram of your hearst that showed normal pumping function of the heart and normal heart valves. You underwent a stress test that was normal.  Please call to schedule a follow up with Dr Johansen in 1-2 weeks.      SECONDARY DISCHARGE DIAGNOSES  Diagnosis: Hypertension  Assessment and Plan of Treatment: You have a history of elevated blood pressure and you should continue your blood pressure medications as prescribed.

## 2021-11-16 NOTE — H&P ADULT - ASSESSMENT
74yo F with multiple cardiac risk factors c/o CP and palpitations unrelated to activity, several episodes over the past two weeks. Admit for tele monitoring, serial enzymes, echo and further evaluation pending clinical progression.

## 2021-11-16 NOTE — DISCHARGE NOTE PROVIDER - CARE PROVIDER_API CALL
Yue Martinez)  Cardiology  [address], [address]  [place], [place] [address]  Phone: [phone]  Fax: [phone]  Follow Up Time:

## 2021-11-16 NOTE — H&P ADULT - NSHPLABSRESULTS_GEN_ALL_CORE
13.8   5.72  )-----------( 236      ( 15 Nov 2021 21:13 )             41.9     11-15    142  |  105  |  17  ----------------------------<  116<H>  4.2   |  24  |  1.13    Ca    10.4      15 Nov 2021 21:13    TPro  7.4  /  Alb  4.5  /  TBili  0.2  /  DBili  x   /  AST  37  /  ALT  26  /  AlkPhos  101  11-15       PT/INR - ( 15 Nov 2021 21:13 )   PT: 11.2 sec;   INR: 0.93        PTT - ( 15 Nov 2021 21:13 )  PTT:29.9 sec    CARDIAC MARKERS ( 15 Nov 2021 21:13 )  x     / 0.01 ng/mL / 101 U/L / x     / 1.4 ng/mL

## 2021-11-16 NOTE — H&P ADULT - ENDOCRINE
4/23/21, 7:22 AM EDT    DISCHARGE ON [address] [place] [address] day: 10  Location: [facility]01/001-A Reason for admit: Pneumonia [J18.9]   Procedure:   4/17 PICC  4/22 CXR: B/L Pleural Effusions  Barriers to Discharge: Creatinine 2.4, H 8.4, WBC 22.3; monitor. Palliative Care following.  IV Diuresing continued  PCP: CAITY Martinez CNP  Readmission Risk Score: 23%  Patient Goals/Plan/Treatment Preferences: plans home w spouse (who works), [facility] (nsg, therapy, aide, Palliative Care), has right pleuryx drain, RW, WC; follows w [facility]; ambulates 200 feet; [facility], RW, WC negative

## 2021-11-16 NOTE — H&P ADULT - NSICDXPASTSURGICALHX_GEN_ALL_CORE_FT
PAST SURGICAL HISTORY:  H/O abdominoplasty     S/P hysterectomy     S/P ORIF (open reduction internal fixation) fracture right ankleab

## 2021-11-16 NOTE — H&P ADULT - PROBLEM SELECTOR PLAN 5
Not currently tx but does report dyspepsia with ECASA.  Will start pepcid 20mg daily and continue prn.

## 2021-11-16 NOTE — DISCHARGE NOTE PROVIDER - HOSPITAL COURSE
This is a 74 yo F h/o HTN, dyslipidemia (no current tx), “pre-diabetes” and remote h/o uterine CA tx with surgical resection, XRT and Chemo presented to ED c/o chest pain and palpitations that started this morning.  Pt describes 10/10 left-sided chest tightness radiating to left arm and shoulder, waxing and waning but never resolving since awakening this morning.  This is the 4th episode of similar symptoms in the past few weeks.  She saw her cardiologist, Dr. Johansen, approx 2 weeks ago and found with ?new murmur, recommended for outpatient echo and “other tests” which have not yet been performed.  Today symptoms were associated with palpitations and sense of racing heart so she presented to ED. Per pt her baseline heartrate is 50s/60s and today was 86 with symptoms (on home BP monitor).  She denies SOB, n/v/abd pain, near/syncope or any symptoms to suggest CHF. She reports a "normal" R&LHC and stress test at [facility] approx 25 years ago. In the ED, EKG showed NSR with lateral TWI, no prior for comparison. CXR without I/E.  Cardiac enzymes negative x 1. Covid negative.  Of Note: pt lists "intolerance" to Aspirin; further discussion shows pt suffers from dyspepsia/GI distress with aspirin, but has well-tolerated daily ECASA 81mg in the past and is willing and agreeable to take daily for extended period if recommended. CE negative x2 and chest pain free. ECHO 11/16/21: Normal left and right ventricular size and systolic function. EF 60-65% grade I diastolc dysfunction. no significant valvular disease,  no evidence of pHTN. EST 11/16/21 with normal myocardial perfusion. Pt given appropriate d/c instructions and verbalized understanding. Medications sent to preferred pharmacy. Pt deemed stable for d/c per Dr. Sumner and will f/u with Dr. Johansen in 1-2 weeks.

## 2021-11-16 NOTE — H&P ADULT - PROBLEM SELECTOR PLAN 4
Pt reports "pre-diabetes" with random glucose of 116mg/dl on admission.  Will check A1c with aml and consider ISS prn.

## 2021-11-16 NOTE — DISCHARGE NOTE PROVIDER - NSDCMRMEDTOKEN_GEN_ALL_CORE_FT
brain vitamin: 1 tab(s) orally once a day  ergocalciferol 50 mcg (2000 intl units) oral capsule: 1 cap(s) orally once a day  lisinopril 20 mg oral tablet: 1 tab(s) orally once a day  metoprolol succinate 50 mg oral tablet, extended release: 1 tab(s) orally once a day  NIFEdipine 30 mg oral tablet, extended release: 1 tab(s) orally once a day

## 2021-11-16 NOTE — DISCHARGE NOTE NURSING/CASE MANAGEMENT/SOCIAL WORK - PATIENT PORTAL LINK FT
You can access the FollowMyHealth Patient Portal offered by [facility] by registering at the following website: [email or web address]. By joining [employer]’s FollowMyHealth portal, you will also be able to view your health information using other applications (apps) compatible with our system.

## 2021-11-17 LAB
COVID-19 NUCLEOCAPSID GAM AB INTERP: NEGATIVE — SIGNIFICANT CHANGE UP
COVID-19 NUCLEOCAPSID TOTAL GAM ANTIBODY RESULT: 0.07 INDEX — SIGNIFICANT CHANGE UP
SARS-COV-2 IGG+IGM SERPL QL IA: 0.07 INDEX — SIGNIFICANT CHANGE UP
SARS-COV-2 IGG+IGM SERPL QL IA: NEGATIVE — SIGNIFICANT CHANGE UP

## 2021-11-19 DIAGNOSIS — Z92.21 PERSONAL HISTORY OF ANTINEOPLASTIC CHEMOTHERAPY: ICD-10-CM

## 2021-11-19 DIAGNOSIS — R07.9 CHEST PAIN, UNSPECIFIED: ICD-10-CM

## 2021-11-19 DIAGNOSIS — I10 ESSENTIAL (PRIMARY) HYPERTENSION: ICD-10-CM

## 2021-11-19 DIAGNOSIS — E11.9 TYPE 2 DIABETES MELLITUS WITHOUT COMPLICATIONS: ICD-10-CM

## 2021-11-19 DIAGNOSIS — Z85.42 PERSONAL HISTORY OF MALIGNANT NEOPLASM OF OTHER PARTS OF UTERUS: ICD-10-CM

## 2021-11-19 DIAGNOSIS — E78.5 HYPERLIPIDEMIA, UNSPECIFIED: ICD-10-CM

## 2021-11-19 DIAGNOSIS — K21.9 GASTRO-ESOPHAGEAL REFLUX DISEASE WITHOUT ESOPHAGITIS: ICD-10-CM

## 2021-11-19 DIAGNOSIS — Z90.710 ACQUIRED ABSENCE OF BOTH CERVIX AND UTERUS: ICD-10-CM

## 2021-11-29 ENCOUNTER — OUTPATIENT (OUTPATIENT)
Dept: OUTPATIENT SERVICES | Facility: HOSPITAL | Age: 76
LOS: 1 days | End: 2021-11-29

## 2021-11-29 ENCOUNTER — APPOINTMENT (OUTPATIENT)
Dept: CT IMAGING | Facility: CLINIC | Age: 76
End: 2021-11-29
Payer: SELF-PAY

## 2021-11-29 ENCOUNTER — TRANSCRIPTION ENCOUNTER (OUTPATIENT)
Age: 76
End: 2021-11-29

## 2021-11-29 ENCOUNTER — APPOINTMENT (OUTPATIENT)
Dept: HEART AND VASCULAR | Facility: CLINIC | Age: 76
End: 2021-11-29
Payer: MEDICARE

## 2021-11-29 VITALS
TEMPERATURE: 98 F | WEIGHT: 152 LBS | HEART RATE: 62 BPM | OXYGEN SATURATION: 96 % | SYSTOLIC BLOOD PRESSURE: 138 MMHG | DIASTOLIC BLOOD PRESSURE: 82 MMHG | HEIGHT: 64 IN | BODY MASS INDEX: 25.95 KG/M2

## 2021-11-29 VITALS — DIASTOLIC BLOOD PRESSURE: 79 MMHG | SYSTOLIC BLOOD PRESSURE: 130 MMHG

## 2021-11-29 DIAGNOSIS — I25.10 ATHEROSCLEROTIC HEART DISEASE OF NATIVE CORONARY ARTERY W/OUT ANGINA PECTORIS: ICD-10-CM

## 2021-11-29 PROCEDURE — 75571 CT HRT W/O DYE W/CA TEST: CPT | Mod: 26

## 2021-11-29 PROCEDURE — 99214 OFFICE O/P EST MOD 30 MIN: CPT | Mod: 25

## 2021-11-29 NOTE — HISTORY OF PRESENT ILLNESS
[FreeTextEntry1] : 74 y/o female with h/o htn, uterine ca s/p ARPITA/chemo/xrt, predm, cad, hl, overweight who presents for f/up today\par \par last seen  for chest tightness\par went to ER  and had cv enzymes neg x 2\par \par per patient notes symptoms associated with smoking/hookah/vape of individual staying in her home \par now resolved as person no longer in house\par \par admitted for nuclear stress and echo\par \par calcium score : score 25 (38%), mild ao calcification\par \par Nuclear stress : \par Impression:\par Myocardial perfusion imaging is normal. Overall left ventricular systolic function is hyperdynamic without regional wall motion abnormalities. The EKG stress test is normal. Additionally, abnormal tracer uptake in the left axilla noted. Results discussed with referring MD.\par \par Echo: : CONCLUSIONS:\par  1. Normal left and right ventricular size and systolic function.\par  2. Doppler findings are not conclusive but are suggestive of grade I diastolc dysfunction.\par  3. Aortic sclerosis without significant stenosis.\par  4. No evidence of pulmonary hypertension, pulmonary artery systolic pressure is 29 mmHg.\par  5. No pericardial effusion.\par  6. No prior echo is available for comparison.\par \par no cp\par notes sharp pain in shoulder/arm/hand a few months ago \par no sob, orthopnea, pnd, syncope, palpitations, edema, fatigue \par \par \par had chemo x 6 sessions post hysterectomy\par \par \par walk daily\par diet healthy\par lost 10 lbs\par HTN diagnosed in 40's\par \par no pregnancy complications\par no mental health issues\par \par \par PMH/PSH:\par cad\par hl\par predm\par htn\par uterine ca s/p ARPITA  - chemo/radiation\par abdominoplasty \par overweight\par \par \par MEDS:\par metoprolol succinate 50 mg qd\par nifedipine 30 mg qd\par lisinopril 20 mg qd\par vit C/D\par \par SH:\par no tobacco\par social etoh\par no drugs\par 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 [id number]\par                            : 1945\par Ref. Physician:  Alfonso Sumner MD\par \par Dear Dr. Sumner:\par \par Your patient, Ana Monroe, a 75-year-old-female, was evaluated in our laboratory on y exercise and rest sestamibi separate acquisition SPECT myocardial perfusion imaging.\par \par Indication:\par Diagnosis of coronary artery disease\par \par Clinical History:\par The patient is a 75-year-old-woman with a PMHx of HTN, HLD and h/o uterine CA s/p chemo/RTx admitted with chest pain and now presents for ischemic evaluation.\par Her medications include ASA, Lisinopril, Toprol, Nifedipine\par Her weight is 164 lbs and his height is 5?5?.\par \par Procedure:\par A resting injection of 6.6 mCi of Tc-99m sestamibi was made at? 1222 hrs and semi-supine gated tomographic images were obtained 30 minutes later. Following the acquisition of the resting images, exercise was performed on a treadmill using the [place] Protocol. One minute prior to the termination of exercise, 19.2 mCi of Tc-99m sestamibi was injected intravenously at? 1335 hrs. Semi-supine and semi-upright gated tomographic images were obtained 30 minutes after the injection of the Tc-99m sestamibi.\par \par Findings:\par She exercised from a resting heart rate of 75 beats per minute and blood pressure of 130/80 mm Hg to peak values of 160 beats per minute (111% of predicted maximum) and 170/80 mm Hg. Exercise was discontinued after 4 minutes ( 5.8 METS)  because of fatigue. The patient did not develop any symptoms other than fatigue during the procedure.\par \par The resting ECG revealed normal sinus rhythm with LVH and NS TWC. The stress ECG revealed sinus tachycardia. Neither ST segment depression nor cardiac arrhythmias were noted during the exercise or recovery periods.\par \par The overall quality of the imaging is excellent. Visual analysis of the rest and stress tomographic images demonstrates fixed defects in the inferior wall with normal wall motion, consistent with attenuation artifact. Quantitative analysis does not demonstrate any significant areas of decreased tracer concentration. There is no evidence of ischemic dilation of the left ventricle. The gated images demonstrate normal left ventricular chamber size, wall motion and myocardial thickening. The ejection fraction is 78%. Additionally, the right ventricle is normal.\par \par \par \par EF (%)    78\par     LLN = 50\par \par EDV (ml)  51\par     ULN = 100\par \par ESV (ml)   11\par     ULN = 42\par \par \par \par             Event Rate (%)\par SSS score      MI  Cardiac Death\par <4    (Normal)      0.5    .3\par 4-8   (Mildly abnormal)  2.7    .8\par 9-13 (Mod. abnormal)  2.9    2.3\par >13  (Severely abnormal)  4.2    2.9\par \par \par Impression:\par \par Myocardial perfusion imaging is normal. Overall left ventricular systolic function is hyperdynamic without regional wall motion abnormalities. The EKG stress test is normal. Additionally, abnormal tracer uptake in the left axilla noted. Results discussed with referring MD.\par \par --- End of Report ---\par \par \par \par \par Thank you for the opportunity to participate in the care of this patient.\par \par \par ARMAAN NGUYEN MD; Attending Cardiologist\par This document has been electronically signed. 2021  3:56PM\par EXAM:  ECHO TTE WO CON COMP W DOPP                      \par \par PROCEDURE DATE:  2021  \par \par \par \par INTERPRETATION:  -----------------------------------\par TRANSTHORACIC ECHOCARDIOGRAM REPORT\par \par \par --------------------------------------------------------------------------------\par Patient Name:   ANA MONROE Date of Exam:        2021\par Medical Rec #:  [id number]      Height/Weight:       65.0 in / 164.90 lb\par Account #:      [id number]      BSA:                 1.82 m?\par YOB: 1945   BP:                  116/60 mmHg\par Patient Age:    75 years     HR:                  59 bpm\par Patient Gender: F            Sonographer:         Bowen Johnson\par                              Referring Physician: ALFONSO SUMNER\[facility] \par \par --------------------------------------------------------------------------------\par CPT:               ECHO TTE WO CON COMP W DOPP - [id number]; - [id number]\par Indication(s):     R07.9 - Chest pain, unspecified\par Procedure:         A complete two-dimensional transthoracic echocardiogram was\par                    performed: 2D, M-mode, spectral and color flow Doppler.\par Ordering Location: [facility] \[facility] Study Quality:     Study quality was good.\par \par \par --------------------------------------------------------------------------------\par CONCLUSIONS:\par \par  1. Normal left and right ventricular size and systolic function.\par  2. Doppler findings are not conclusive but are suggestive of grade I diastolc dysfunction.\par  3. Aortic sclerosis without significant stenosis.\par  4. No evidence of pulmonary hypertension, pulmonary artery systolic pressure is 29 mmHg.\par  5. No pericardial effusion.\par  6. No prior echo is available for comparison.\par \par --------------------------------------------------------------------------------\par 2D AND M-MODE MEASUREMENTS (normal ranges within parentheses):\par \par Left Ventricle:         Normal - Men Normal - Women\par IVSd (2D):      0.86 cm  (0.6-1.0)     (0.6-0.9)\par LVPWd (2D):     0.84 cm  (0.6-1.0)     (0.6-0.9)\par LVIDd (2D):     4.21 cm  (4.2-5.8)     (3.8-5.2)\par LVIDs (2D):     2.69 cm\par LV FS (2D):     36.1 %     (>25%)\par LV EF (MOD BP):  65 %      (>55%)\par Aorta/Left Atrium:         Normal - Men Normal - Women\par Aortic Root (2D):  3.40 cm  (2.4-3.7)\par \par LA Volume A/L:\par \par Right Ventricle:\par RVd (2D):           3.09\par \par LV DIASTOLIC FUNCTION:\par \par MV Peak E: 63.70 cm/s  MV e' lat:  6.1 cm/s MV E/e' lat ratio: 10.4\par MV Peak A: 118.00 cm/s MV e' med:  4.5 cm/s MV E/e' med ratio: 14.2\par E/A Ratio: 0.54        Decel Time: 254 msec MV E/e' av.3\par \par SPECTRAL DOPPLER ANALYSIS:\par \par Mitral Valve:\par MV Max Trevon:   MV P1/2 Time: 73.66 msec\par MV Mean Grad: MV Area, PHT: 2.99 cm?\par \par \par Aortic Valve: AoV Max Trevon:             AoV Peak PG:            AoV Mean P mmHg\par LVOT Vmax:      1.14 m/s LVOT VTI:      0.299 m  LVOT Diameter: 1.90 cm\par AoV Area, VMax:          AoV Area, VTI: 2.32 cm? AoV Area, Vmn: 2.26 cm?\par \par \par Aortic Insufficiency:\par AI Half-time:  653 msec\par AI Decel Rate: 1.63 m/s?\par \par \par Tricuspid Valve and PA/RV Systolic Pressure: TR Max Velocity: 2.55 m/s RA Pressure: 3 mmHg  RVSP/PASP: 29 mmHg\par TAPSE:           23 mm    RV S':       12 cm/s\par \par \par Pulmonic Valve:\par PV Max Velocity: PV Max PG: PV Mean P mmHg\par \par \par --------------------------------------------------------------------------------\par FINDINGS:\par \par Left Ventricle:\par The left ventricle is normal in size, wall thickness, and systolic function with a calculated ejection fraction of 60-65%. There are no regional wall motion abnormalities seen. Doppler findings are not conclusive but are suggestive of grade I diastolc dysfunction.\par \par Right Ventricle:\par The right ventricle is normal in size. Right ventricular systolic function is normal. The tricuspid annular plane systolic excursion (TAPSE) is 23.00 mm (normal >=17 mm). RV tissue Doppler S' is 12.00 cm/s (normal >10 cm/s).\par \par Left Atrium:\par The left atrium is normal in size. 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 There is trace aortic regurgitation.\par \par Mitral Valve:\par Structurally normal mitral valve with normal leaflet excursion. There is trace mitral regurgitation.\par \par Tricuspid Valve:\par Structurally normal tricuspid valve with normal leaflet excursion. There is trace tricuspid regurgitation. There is no echocardiographic evidence of pulmonary hypertension, pulmonary artery systolic pressure is 29 mmHg.\par \par Inferior Vena Cava:\par The inferior vena cava is normal in size (<2.1cm) with normal inspiratory collapse (>50%) consistent with normal right atrial pressure (\par 3, range 0-5mmHg).\par \par Pulmonic Valve:\par Structurally normal pulmonic valve with normal leaflet excursion. There is mild pulmonic regurgitation.\par \par Aorta:\par The aortic root is normal in size. The aortic arch is normal without evidence of aortic coarctation.\par \par Pericardium:\par No pericardial effusion is seen.\par \par --------------------------------------------------------------------------------\par Eliane Garcia MD\par \par Electronically signed by Eilane Garcia MD\par Signature Date/Time: 2021/1:03:16 PM\par \par \par \par *** Final ***\par \par \par \par \par \par \par \par "Thank you for the opportunity to participate in the care of this patient."\par \par \par ELIANE Nicolepar This document has been electronically signed. 2021 11:11AM\par \par

## 2021-11-29 NOTE — DISCUSSION/SUMMARY
[Patient] : the patient [___ Month(s)] : in [unfilled] month(s) [FreeTextEntry1] : \par 74 y/o female with h/o htn, predm, cad, hl, uterine ca s/p ARPITA/chemo/xrt, overweight who presents for f/up today\par \par -calcium score 11/21: score 25 (38%), mild ao calcification\par -start asa 81 mg qd, lipitor 20 mg qhs\par -advised she f/up with PCP about nuclear tracer uptake left axilla - if needs further evaluation\par -ekg 11/21 - nsr, lad, possible lvh, no st/t changes\par -labs 2021 reviewed\par -Nuclear stress 11/21: Impression:\par Myocardial perfusion imaging is normal. Overall left ventricular systolic function is hyperdynamic without regional wall motion abnormalities. The EKG stress test is normal. Additionally, abnormal tracer uptake in the left axilla noted. Results discussed with referring MD.\par -Echo: 11/21: CONCLUSIONS:\par  1. Normal left and right ventricular size and systolic function.\par  2. Doppler findings are not conclusive but are suggestive of grade I diastolc dysfunction.\par  3. Aortic sclerosis without significant stenosis.\par  4. No evidence of pulmonary hypertension, pulmonary artery systolic pressure is 29 mmHg.\par  5. No pericardial effusion.\par  6. No prior echo is available for comparison.\par -continue BB, CCB, ACE \par -counseled on cvd risk factors\par -monitor A1c for angelica\par -referral to pcp\par -f/up 2-3 months for htn, lipids\par \par I have spent 30 minutes reviewing labs, records, tests and discussed cvd risk factors, htn \par \par

## 2021-12-22 PROCEDURE — 86769 SARS-COV-2 COVID-19 ANTIBODY: CPT

## 2021-12-22 PROCEDURE — 80048 BASIC METABOLIC PNL TOTAL CA: CPT

## 2021-12-22 PROCEDURE — 85730 THROMBOPLASTIN TIME PARTIAL: CPT

## 2021-12-22 PROCEDURE — 85027 COMPLETE CBC AUTOMATED: CPT

## 2021-12-22 PROCEDURE — 93306 TTE W/DOPPLER COMPLETE: CPT

## 2021-12-22 PROCEDURE — 84484 ASSAY OF TROPONIN QUANT: CPT

## 2021-12-22 PROCEDURE — 84100 ASSAY OF PHOSPHORUS: CPT

## 2021-12-22 PROCEDURE — 85025 COMPLETE CBC W/AUTO DIFF WBC: CPT

## 2021-12-22 PROCEDURE — 82550 ASSAY OF CK (CPK): CPT

## 2021-12-22 PROCEDURE — 71045 X-RAY EXAM CHEST 1 VIEW: CPT

## 2021-12-22 PROCEDURE — 78452 HT MUSCLE IMAGE SPECT MULT: CPT

## 2021-12-22 PROCEDURE — 83036 HEMOGLOBIN GLYCOSYLATED A1C: CPT

## 2021-12-22 PROCEDURE — 36415 COLL VENOUS BLD VENIPUNCTURE: CPT

## 2021-12-22 PROCEDURE — A9500: CPT

## 2021-12-22 PROCEDURE — 99285 EMERGENCY DEPT VISIT HI MDM: CPT

## 2021-12-22 PROCEDURE — 83735 ASSAY OF MAGNESIUM: CPT

## 2021-12-22 PROCEDURE — 87635 SARS-COV-2 COVID-19 AMP PRB: CPT

## 2021-12-22 PROCEDURE — 85610 PROTHROMBIN TIME: CPT

## 2021-12-22 PROCEDURE — 80061 LIPID PANEL: CPT

## 2021-12-22 PROCEDURE — 93017 CV STRESS TEST TRACING ONLY: CPT

## 2021-12-22 PROCEDURE — 80053 COMPREHEN METABOLIC PANEL: CPT

## 2021-12-22 PROCEDURE — 84443 ASSAY THYROID STIM HORMONE: CPT

## 2021-12-22 PROCEDURE — 83880 ASSAY OF NATRIURETIC PEPTIDE: CPT

## 2021-12-22 PROCEDURE — 82553 CREATINE MB FRACTION: CPT

## 2021-12-22 RX ORDER — ASPIRIN 81 MG/1
81 TABLET ORAL DAILY
Qty: 90 | Refills: 1 | Status: ACTIVE | COMMUNITY
Start: 2021-11-29 | End: 1900-01-01

## 2022-01-24 ENCOUNTER — APPOINTMENT (OUTPATIENT)
Dept: HEART AND VASCULAR | Facility: CLINIC | Age: 77
End: 2022-01-24
Payer: MEDICARE

## 2022-01-24 ENCOUNTER — NON-APPOINTMENT (OUTPATIENT)
Age: 77
End: 2022-01-24

## 2022-01-24 VITALS — SYSTOLIC BLOOD PRESSURE: 126 MMHG | DIASTOLIC BLOOD PRESSURE: 74 MMHG

## 2022-01-24 VITALS
HEIGHT: 64 IN | SYSTOLIC BLOOD PRESSURE: 143 MMHG | DIASTOLIC BLOOD PRESSURE: 76 MMHG | WEIGHT: 155 LBS | TEMPERATURE: 97.3 F | OXYGEN SATURATION: 96 % | HEART RATE: 81 BPM | BODY MASS INDEX: 26.46 KG/M2

## 2022-01-24 DIAGNOSIS — Z01.818 ENCOUNTER FOR OTHER PREPROCEDURAL EXAMINATION: ICD-10-CM

## 2022-01-24 PROCEDURE — 93000 ELECTROCARDIOGRAM COMPLETE: CPT | Mod: NC

## 2022-01-24 PROCEDURE — 36415 COLL VENOUS BLD VENIPUNCTURE: CPT

## 2022-01-24 PROCEDURE — 99214 OFFICE O/P EST MOD 30 MIN: CPT | Mod: 25

## 2022-01-27 LAB
ALBUMIN SERPL ELPH-MCNC: 4.9 G/DL
ALP BLD-CCNC: 114 U/L
ALT SERPL-CCNC: 78 U/L
ANION GAP SERPL CALC-SCNC: 12 MMOL/L
APPEARANCE: ABNORMAL
APTT BLD: 29.5 SEC
AST SERPL-CCNC: 57 U/L
BACTERIA: NEGATIVE
BASOPHILS # BLD AUTO: 0.04 K/UL
BASOPHILS NFR BLD AUTO: 0.7 %
BILIRUB SERPL-MCNC: 0.3 MG/DL
BILIRUBIN URINE: NEGATIVE
BLOOD URINE: NEGATIVE
BUN SERPL-MCNC: 17 MG/DL
CALCIUM OXALATE CRYSTALS: ABNORMAL
CALCIUM SERPL-MCNC: 10.4 MG/DL
CHLORIDE SERPL-SCNC: 104 MMOL/L
CHOLEST SERPL-MCNC: 162 MG/DL
CO2 SERPL-SCNC: 24 MMOL/L
COLOR: YELLOW
CREAT SERPL-MCNC: 0.94 MG/DL
EOSINOPHIL # BLD AUTO: 0.08 K/UL
EOSINOPHIL NFR BLD AUTO: 1.4 %
ESTIMATED AVERAGE GLUCOSE: 123 MG/DL
GLUCOSE QUALITATIVE U: NEGATIVE
GLUCOSE SERPL-MCNC: 117 MG/DL
HBA1C MFR BLD HPLC: 5.9 %
HCT VFR BLD CALC: 44.7 %
HDLC SERPL-MCNC: 72 MG/DL
HGB BLD-MCNC: 14.8 G/DL
HYALINE CASTS: 0 /LPF
IMM GRANULOCYTES NFR BLD AUTO: 0.2 %
INR PPP: 0.9 RATIO
KETONES URINE: NEGATIVE
LDLC SERPL CALC-MCNC: 68 MG/DL
LEUKOCYTE ESTERASE URINE: ABNORMAL
LYMPHOCYTES # BLD AUTO: 2.75 K/UL
LYMPHOCYTES NFR BLD AUTO: 46.6 %
MAGNESIUM SERPL-MCNC: 2.4 MG/DL
MAN DIFF?: NORMAL
MCHC RBC-ENTMCNC: 32 PG
MCHC RBC-ENTMCNC: 33.1 GM/DL
MCV RBC AUTO: 96.5 FL
MICROSCOPIC-UA: NORMAL
MONOCYTES # BLD AUTO: 0.55 K/UL
MONOCYTES NFR BLD AUTO: 9.3 %
NEUTROPHILS # BLD AUTO: 2.47 K/UL
NEUTROPHILS NFR BLD AUTO: 41.8 %
NITRITE URINE: NEGATIVE
NONHDLC SERPL-MCNC: 90 MG/DL
PH URINE: 6.5
PLATELET # BLD AUTO: 241 K/UL
POTASSIUM SERPL-SCNC: 4.5 MMOL/L
PROT SERPL-MCNC: 7.6 G/DL
PROTEIN URINE: NORMAL
PT BLD: 10.8 SEC
RBC # BLD: 4.63 M/UL
RBC # FLD: 13.4 %
RED BLOOD CELLS URINE: 2 /HPF
SODIUM SERPL-SCNC: 141 MMOL/L
SPECIFIC GRAVITY URINE: 1.03
SQUAMOUS EPITHELIAL CELLS: 5 /HPF
TRIGL SERPL-MCNC: 111 MG/DL
TSH SERPL-ACNC: 0.94 UIU/ML
UROBILINOGEN URINE: NORMAL
WBC # FLD AUTO: 5.9 K/UL
WHITE BLOOD CELLS URINE: 6 /HPF

## 2022-01-27 NOTE — HISTORY OF PRESENT ILLNESS
[FreeTextEntry1] : 75 y/o female with h/o htn, uterine ca s/p ARPITA/chemo/xrt, predm, cad, hl, overweight who presents for f/up today and preop evaluation prior to liposuction and fat transfer\par \par \par last seen  \par started on asa, statin\par unable to tolerate asa recently after trying \par  \par no cp, sob, orthopnea, pnd, syncope, palpitations, edema, fatigue \par \par calcium score : score 25 (38%), mild ao calcification\par \par Nuclear stress : \par Impression:\par Myocardial perfusion imaging is normal. Overall left ventricular systolic function is hyperdynamic without regional wall motion abnormalities. The EKG stress test is normal. Additionally, abnormal tracer uptake in the left axilla noted. Results discussed with referring MD.\par \par Echo: : CONCLUSIONS:\par  1. Normal left and right ventricular size and systolic function.\par  2. Doppler findings are not conclusive but are suggestive of grade I diastolc dysfunction.\par  3. Aortic sclerosis without significant stenosis.\par  4. No evidence of pulmonary hypertension, pulmonary artery systolic pressure is 29 mmHg.\par  5. No pericardial effusion.\par  6. 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 [id number]\par   : 1945\par Ref. Physician: Alfonso Sumner MD\par \par Dear Dr. Sumner:\par \par Your patient, Ana Monroe, a 75-year-old-female, was evaluated in our laboratory on y exercise and rest sestamibi separate acquisition SPECT myocardial perfusion imaging.\par \par Indication:\par Diagnosis of coronary artery disease\par \par Clinical History:\par The patient is a 75-year-old-woman with a PMHx of HTN, HLD and h/o uterine CA s/p chemo/RTx admitted with chest pain and now presents for ischemic evaluation.\par Her medications include ASA, Lisinopril, Toprol, Nifedipine\par Her weight is 164 lbs and his height is 5?5?.\par \par Procedure:\par A resting injection of 6.6 mCi of Tc-99m sestamibi was made at? 1222 hrs and semi-supine gated tomographic images were obtained 30 minutes later. Following the acquisition of the resting images, exercise was performed on a treadmill using the [place] Protocol. One minute prior to the termination of exercise, 19.2 mCi of Tc-99m sestamibi was injected intravenously at? 1335 hrs. Semi-supine and semi-upright gated tomographic images were obtained 30 minutes after the injection of the Tc-99m sestamibi.\par \par Findings:\par She exercised from a resting heart rate of 75 beats per minute and blood pressure of 130/80 mm Hg to peak values of 160 beats per minute (111% of predicted maximum) and 170/80 mm Hg. Exercise was discontinued after 4 minutes ( 5.8 METS) because of fatigue. The patient did not develop any symptoms other than fatigue during the procedure.\par \par The resting ECG revealed normal sinus rhythm with LVH and NS TWC. The stress ECG revealed sinus tachycardia. Neither ST segment depression nor cardiac arrhythmias were noted during the exercise or recovery periods.\par \par The overall quality of the imaging is excellent. Visual analysis of the rest and stress tomographic images demonstrates fixed defects in the inferior wall with normal wall motion, consistent with attenuation artifact. Quantitative analysis does not demonstrate any significant areas of decreased tracer concentration. There is no evidence of ischemic dilation of the left ventricle. The gated images demonstrate normal left ventricular chamber size, wall motion and myocardial thickening. The ejection fraction is 78%. Additionally, the right ventricle is normal.\par \par \par \par EF (%) 78\par  LLN = 50\par \par EDV (ml) 51\par  ULN = 100\par \par ESV (ml) 11\par  ULN = 42\par \par \par \par  Event Rate (%)\par SSS score MI Cardiac Death\par <4 (Normal) 0.5 .3\par 4-8 (Mildly abnormal) 2.7 .8\par 9-13 (Mod. abnormal) 2.9 2.3\par >13 (Severely abnormal) 4.2 2.9\par \par \par Impression:\par \par Myocardial perfusion imaging is normal. Overall left ventricular systolic function is hyperdynamic without regional wall motion abnormalities. The EKG stress test is normal. Additionally, abnormal tracer uptake in the left axilla noted. Results discussed with referring MD.\par \par --- End of Report ---\par \par \par \par \par Thank you for the opportunity to participate in the care of this patient.\par \par \par ARMAAN NGUYEN MD; Attending Cardiologist\par This document has been electronically signed. 2021 3:56PM\par EXAM: ECHO TTE WO CON COMP W DOPP  \par \par PROCEDURE DATE: 2021 \par \par \par \par INTERPRETATION: -----------------------------------\par TRANSTHORACIC ECHOCARDIOGRAM REPORT\par \par \par --------------------------------------------------------------------------------\par Patient Name: ANA MONROE Date of Exam: 2021\par Medical Rec #: [id number] Height/Weight: 65.0 in / 164.90 lb\par Account #: [id number] BSA:  1.82 m?\par YOB: 1945 BP:  116/60 mmHg\par Patient Age: 75 years HR:  59 bpm\par Patient Gender: F Sonographer: Bowen Johnson\par   Referring Physician: ALFONSO SUMNER\par \par \par --------------------------------------------------------------------------------\par CPT: ECHO TTE WO CON COMP W DOPP - 85154; - [id number]\par Indication(s): R07.9 - Chest pain, unspecified\par Procedure: A complete two-dimensional transthoracic echocardiogram was\par   performed: 2D, M-mode, spectral and color flow Doppler.\par Ordering Location: [facility] \par Study Quality: Study quality was good.\par \par \par --------------------------------------------------------------------------------\par CONCLUSIONS:\par \par  1. Normal left and right ventricular size and systolic function.\par  2. Doppler findings are not conclusive but are suggestive of grade I diastolc dysfunction.\par  3. Aortic sclerosis without significant stenosis.\par  4. No evidence of pulmonary hypertension, pulmonary artery systolic pressure is 29 mmHg.\par  5. No pericardial effusion.\par  6. No prior echo is available for comparison.\par \par --------------------------------------------------------------------------------\par 2D AND M-MODE MEASUREMENTS (normal ranges within parentheses):\par \par Left Ventricle: Normal - Men Normal - Women\par IVSd (2D): 0.86 cm (0.6-1.0) (0.6-0.9)\par LVPWd (2D): 0.84 cm (0.6-1.0) (0.6-0.9)\par LVIDd (2D): 4.21 cm (4.2-5.8) (3.8-5.2)\par LVIDs (2D): 2.69 cm\par LV FS (2D): 36.1 % (>25%)\par LV EF (MOD BP): 65 % (>55%)\par Aorta/Left Atrium: Normal - Men Normal - Women\par Aortic Root (2D): 3.40 cm (2.4-3.7)\par \par LA Volume A/L:\par \par Right Ventricle:\par RVd (2D): 3.09\par \par LV DIASTOLIC FUNCTION:\par \par MV Peak E: 63.70 cm/s MV e' lat: 6.1 cm/s MV E/e' lat ratio: 10.4\par MV Peak A: 118.00 cm/s MV e' med: 4.5 cm/s MV E/e' med ratio: 14.2\par E/A Ratio: 0.54 Decel Time: 254 msec MV E/e' av.3\par \par SPECTRAL DOPPLER ANALYSIS:\par \par Mitral Valve:\par MV Max Trevon: MV P1/2 Time: 73.66 msec\par MV Mean Grad: MV Area, PHT: 2.99 cm?\par \par \par Aortic Valve: AoV Max Trevon: AoV Peak PG: AoV Mean P mmHg\par LVOT Vmax: 1.14 m/s LVOT VTI: 0.299 m LVOT Diameter: 1.90 cm\par AoV Area, VMax: AoV Area, VTI: 2.32 cm? AoV Area, Vmn: 2.26 cm?\par \par \par Aortic Insufficiency:\par AI Half-time: 653 msec\par AI Decel Rate: 1.63 m/s?\par \par \par Tricuspid Valve and PA/RV Systolic Pressure: TR Max Velocity: 2.55 m/s RA Pressure: 3 mmHg RVSP/PASP: 29 mmHg\par TAPSE: 23 mm RV S': 12 cm/s\par \par \par Pulmonic Valve:\par PV Max Velocity: PV Max PG: PV Mean P mmHg\par \par \par --------------------------------------------------------------------------------\par FINDINGS:\par \par Left Ventricle:\par The left ventricle is normal in size, wall thickness, and systolic function with a calculated ejection fraction of 60-65%. There are no regional wall motion abnormalities seen. Doppler findings are not conclusive but are suggestive of grade I diastolc dysfunction.\par \par Right Ventricle:\par The right ventricle is normal in size. Right ventricular systolic function is normal. The tricuspid annular plane systolic excursion (TAPSE) is 23.00 mm (normal >=17 mm). RV tissue Doppler S' is 12.00 cm/s (normal >10 cm/s).\par \par Left Atrium:\par The left atrium is normal in size. 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 There is trace aortic regurgitation.\par \par Mitral Valve:\par Structurally normal mitral valve with normal leaflet excursion. There is trace mitral regurgitation.\par \par Tricuspid Valve:\par Structurally normal tricuspid valve with normal leaflet excursion. There is trace tricuspid regurgitation. There is no echocardiographic evidence of pulmonary hypertension, pulmonary artery systolic pressure is 29 mmHg.\par \par Inferior Vena Cava:\par The inferior vena cava is normal in size (<2.1cm) with normal inspiratory collapse (>50%) consistent with normal right atrial pressure (\par 3, range 0-5mmHg).\par \par Pulmonic Valve:\par Structurally normal pulmonic valve with normal leaflet excursion. There is mild pulmonic regurgitation.\par \par Aorta:\par The aortic root is normal in size. The aortic arch is normal without evidence of aortic coarctation.\par \par Pericardium:\par No pericardial effusion is seen.\par \par --------------------------------------------------------------------------------\par SALAS Fergusonpar \par Electronically signed by SALAS Fergusonpar Signature Date/Time: 2021/1:03:16 PM\par \par \par \par *** Final ***\par \par \par \par \par \par \par \par "Thank you for the opportunity to participate in the care of this patient."\par \par \par AZAEL Nicolepar This document has been electronically signed. 2021 11:11AM\par

## 2022-01-27 NOTE — DISCUSSION/SUMMARY
[Patient] : the patient [___ Month(s)] : in [unfilled] month(s) [FreeTextEntry1] : 77 y/o female with h/o htn, predm, cad, hl, uterine ca s/p ARPITA/chemo/xrt, overweight who presents for f/up today and preop evaluation for liposuction and fat transfer \par \par \par She has no h/o cva, rf, chf, dm\par she has stable IHD\par she can peform >4 METS\par \par -calcium score 11/21: score 25 (38%), mild ao calcification\par -continue asa 81 mg qd, lipitor 20 mg qhs\par -advised she f/up with PCP about nuclear tracer uptake left axilla - if needs further evaluation\par -ekg ordered today - SB, lafb, possible lvh, no st/t changes\par -labs ordered - reviewed 2022\par -ekg 11/21 - nsr, lad, possible lvh, no st/t changes\par -labs 2021 reviewed\par -Nuclear stress 11/21: Impression:\par Myocardial perfusion imaging is normal. Overall left ventricular systolic function is hyperdynamic without regional wall motion abnormalities. The EKG stress test is normal. Additionally, abnormal tracer uptake in the left axilla noted. Results discussed with referring MD.\par -Echo: 11/21: CONCLUSIONS:\par  1. Normal left and right ventricular size and systolic function.\par  2. Doppler findings are not conclusive but are suggestive of grade I diastolc dysfunction.\par  3. Aortic sclerosis without significant stenosis.\par  4. No evidence of pulmonary hypertension, pulmonary artery systolic pressure is 29 mmHg.\par  5. No pericardial effusion.\par  6. No prior echo is available for comparison.\par -continue BB, CCB, ACE, statin\par -Ok to hold aspirin periop\par -counseled on cvd risk factors\par -monitor A1c for angelica\par -f/up 6 months for htn \par \par Overall she is low-moderate risk for a moderate risk procedure and can proceed to the OR without further testing. \par \par I have spent 30 minutes reviewing labs, records, tests and discussed cvd risk factors, htn, preop evaluation\par \par

## 2022-06-15 RX ORDER — ASPIRIN 81 MG/1
81 TABLET, COATED ORAL
Qty: 90 | Refills: 1 | Status: ACTIVE | COMMUNITY
Start: 2022-06-15 | End: 1900-01-01

## 2022-09-22 ENCOUNTER — APPOINTMENT (OUTPATIENT)
Dept: CARE COORDINATION | Facility: HOME HEALTH | Age: 77
End: 2022-09-22

## 2023-02-27 ENCOUNTER — APPOINTMENT (OUTPATIENT)
Dept: HEART AND VASCULAR | Facility: CLINIC | Age: 78
End: 2023-02-27
Payer: MEDICARE

## 2023-02-27 VITALS
TEMPERATURE: 97.6 F | HEART RATE: 80 BPM | SYSTOLIC BLOOD PRESSURE: 133 MMHG | OXYGEN SATURATION: 96 % | WEIGHT: 161 LBS | HEIGHT: 64 IN | DIASTOLIC BLOOD PRESSURE: 83 MMHG | BODY MASS INDEX: 27.49 KG/M2

## 2023-02-27 VITALS — SYSTOLIC BLOOD PRESSURE: 125 MMHG | DIASTOLIC BLOOD PRESSURE: 78 MMHG

## 2023-02-27 PROCEDURE — 99214 OFFICE O/P EST MOD 30 MIN: CPT | Mod: 25

## 2023-02-27 PROCEDURE — 93000 ELECTROCARDIOGRAM COMPLETE: CPT

## 2023-02-27 PROCEDURE — 36415 COLL VENOUS BLD VENIPUNCTURE: CPT

## 2023-03-01 RX ORDER — ATORVASTATIN CALCIUM 20 MG/1
20 TABLET, FILM COATED ORAL
Qty: 90 | Refills: 1 | Status: DISCONTINUED | COMMUNITY
Start: 2021-11-29 | End: 2023-03-01

## 2023-03-01 NOTE — DISCUSSION/SUMMARY
[Patient] : the patient [___ Month(s)] : in [unfilled] month(s) [EKG obtained to assist in diagnosis and management of assessed problem(s)] : EKG obtained to assist in diagnosis and management of assessed problem(s) [FreeTextEntry1] : 78 y/o female with h/o htn, predm, cad, hl, uterine ca s/p ARPITA/chemo/xrt, overweight who presents for f/up today \par \par -calcium score 11/21: score 25 (38%), mild ao calcification\par -f/up with PCP about nuclear tracer uptake left axilla - she states evaluated and ok\par -ekg ordered today - SR, lad, no st/t changes\par -ekg 1/22 - SB, lafb, possible lvh, no st/t changes\par -labs 2022 reviewed, ordered labs today\par -she will confirm 2 statins failed and may reattempt prava if not tried vs zetia\par -Nuclear stress 11/21: Impression:\par Myocardial perfusion imaging is normal. Overall left ventricular systolic function is hyperdynamic without regional wall motion abnormalities. The EKG stress test is normal. Additionally, abnormal tracer uptake in the left axilla noted. Results discussed with referring MD.\par -Echo: 11/21: CONCLUSIONS:\par  1. Normal left and right ventricular size and systolic function.\par  2. Doppler findings are not conclusive but are suggestive of grade I diastolc dysfunction.\par  3. Aortic sclerosis without significant stenosis.\par  4. No evidence of pulmonary hypertension, pulmonary artery systolic pressure is 29 mmHg.\par  5. No pericardial effusion.\par  6. No prior echo is available for comparison.\par -continue BB, CCB, ACE, asa\par -counseled on cvd risk factors\par -monitor A1c for predm\par -f/up 6 months for htn \par  \par \par I have spent 30 minutes reviewing labs, records, tests and discussed cvd risk factors, htn, cad\par

## 2023-03-01 NOTE — HISTORY OF PRESENT ILLNESS
[FreeTextEntry1] : 78 y/o female with h/o htn, uterine ca s/p ARPITA/chemo/xrt, predm, cad, hl, overweight who presents for f/up today \par \par last seen  as preop evaluation prior to liposuction and fat transfer\par  \par notes she was unable to tolerate 2 statins due to side effects - lipitor and crestor\par off of statins for 6 months \par  \par no cp, sob, orthopnea, pnd, syncope, palpitations, edema, fatigue \par \par calcium score : score 25 (38%), mild ao calcification\par \par Nuclear stress : \par Impression:\par Myocardial perfusion imaging is normal. Overall left ventricular systolic function is hyperdynamic without regional wall motion abnormalities. The EKG stress test is normal. Additionally, abnormal tracer uptake in the left axilla noted. Results discussed with referring MD.\par \par Echo: : CONCLUSIONS:\par  1. Normal left and right ventricular size and systolic function.\par  2. Doppler findings are not conclusive but are suggestive of grade I diastolc dysfunction.\par  3. Aortic sclerosis without significant stenosis.\par  4. No evidence of pulmonary hypertension, pulmonary artery systolic pressure is 29 mmHg.\par  5. No pericardial effusion.\par  6. No prior echo is available for comparison.\par \par \par \par had chemo x 6 sessions post hysterectomy\par \par \par walk daily\par diet healthy\par  \par HTN diagnosed in 40's\par \par no pregnancy complications\par no mental health issues\par \par \par PMH/PSH:\par cad\par hl\par predm\par htn\par uterine ca s/p ARPITA  - chemo/radiation\par abdominoplasty \par overweight\par liposuction and fat transfer \par \par \par MEDS:\par asa 8 1mg qd\par metoprolol succinate 50 mg qd\par nifedipine 30 mg qd\par lisinopril 20 mg qd\par vit C/D\par \par SH:\par no tobacco\par social etoh\par no drugs\par 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

## 2023-03-02 LAB
ALBUMIN SERPL ELPH-MCNC: 4.4 G/DL
ALP BLD-CCNC: 99 U/L
ALT SERPL-CCNC: 32 U/L
ANION GAP SERPL CALC-SCNC: 12 MMOL/L
APPEARANCE: CLEAR
AST SERPL-CCNC: 26 U/L
BACTERIA: NEGATIVE
BASOPHILS # BLD AUTO: 0.04 K/UL
BASOPHILS NFR BLD AUTO: 0.7 %
BILIRUB SERPL-MCNC: 0.3 MG/DL
BILIRUBIN URINE: NEGATIVE
BLOOD URINE: NEGATIVE
BUN SERPL-MCNC: 16 MG/DL
CALCIUM SERPL-MCNC: 10.3 MG/DL
CHLORIDE SERPL-SCNC: 105 MMOL/L
CHOLEST SERPL-MCNC: 231 MG/DL
CO2 SERPL-SCNC: 26 MMOL/L
COLOR: NORMAL
CREAT SERPL-MCNC: 0.96 MG/DL
CREAT SPEC-SCNC: 91 MG/DL
EGFR: 61 ML/MIN/1.73M2
EOSINOPHIL # BLD AUTO: 0.14 K/UL
EOSINOPHIL NFR BLD AUTO: 2.4 %
ESTIMATED AVERAGE GLUCOSE: 126 MG/DL
GLUCOSE QUALITATIVE U: NEGATIVE
GLUCOSE SERPL-MCNC: 91 MG/DL
HBA1C MFR BLD HPLC: 6 %
HCT VFR BLD CALC: 47 %
HDLC SERPL-MCNC: 62 MG/DL
HGB BLD-MCNC: 15.2 G/DL
HYALINE CASTS: 1 /LPF
IMM GRANULOCYTES NFR BLD AUTO: 0 %
KETONES URINE: NEGATIVE
LDLC SERPL CALC-MCNC: 149 MG/DL
LEUKOCYTE ESTERASE URINE: ABNORMAL
LYMPHOCYTES # BLD AUTO: 3.33 K/UL
LYMPHOCYTES NFR BLD AUTO: 57.6 %
MAGNESIUM SERPL-MCNC: 2.2 MG/DL
MAN DIFF?: NORMAL
MCHC RBC-ENTMCNC: 31.3 PG
MCHC RBC-ENTMCNC: 32.3 GM/DL
MCV RBC AUTO: 96.9 FL
MICROALBUMIN 24H UR DL<=1MG/L-MCNC: <1.2 MG/DL
MICROALBUMIN/CREAT 24H UR-RTO: NORMAL MG/G
MICROSCOPIC-UA: NORMAL
MONOCYTES # BLD AUTO: 0.56 K/UL
MONOCYTES NFR BLD AUTO: 9.7 %
NEUTROPHILS # BLD AUTO: 1.71 K/UL
NEUTROPHILS NFR BLD AUTO: 29.6 %
NITRITE URINE: NEGATIVE
NONHDLC SERPL-MCNC: 169 MG/DL
PH URINE: 6
PLATELET # BLD AUTO: 251 K/UL
POTASSIUM SERPL-SCNC: 4.3 MMOL/L
PROT SERPL-MCNC: 7.6 G/DL
PROTEIN URINE: NEGATIVE
RBC # BLD: 4.85 M/UL
RBC # FLD: 13 %
RED BLOOD CELLS URINE: 2 /HPF
SODIUM SERPL-SCNC: 143 MMOL/L
SPECIFIC GRAVITY URINE: 1.01
SQUAMOUS EPITHELIAL CELLS: 4 /HPF
TRIGL SERPL-MCNC: 99 MG/DL
TSH SERPL-ACNC: 1.6 UIU/ML
UROBILINOGEN URINE: NORMAL
WBC # FLD AUTO: 5.78 K/UL
WHITE BLOOD CELLS URINE: 1 /HPF

## 2023-04-10 ENCOUNTER — NON-APPOINTMENT (OUTPATIENT)
Age: 78
End: 2023-04-10

## 2023-04-18 DIAGNOSIS — E78.5 HYPERLIPIDEMIA, UNSPECIFIED: ICD-10-CM

## 2023-04-18 RX ORDER — PRAVASTATIN SODIUM 10 MG/1
10 TABLET ORAL
Qty: 30 | Refills: 6 | Status: DISCONTINUED | COMMUNITY
Start: 2023-03-01 | End: 2023-04-18

## 2023-07-19 ENCOUNTER — APPOINTMENT (OUTPATIENT)
Dept: HEART AND VASCULAR | Facility: CLINIC | Age: 78
End: 2023-07-19

## 2023-08-29 ENCOUNTER — APPOINTMENT (OUTPATIENT)
Dept: HEART AND VASCULAR | Facility: CLINIC | Age: 78
End: 2023-08-29
Payer: MEDICARE

## 2023-08-29 VITALS — DIASTOLIC BLOOD PRESSURE: 82 MMHG | SYSTOLIC BLOOD PRESSURE: 140 MMHG

## 2023-08-29 VITALS
DIASTOLIC BLOOD PRESSURE: 83 MMHG | SYSTOLIC BLOOD PRESSURE: 153 MMHG | TEMPERATURE: 97.7 F | BODY MASS INDEX: 26.8 KG/M2 | HEIGHT: 64 IN | WEIGHT: 157 LBS | OXYGEN SATURATION: 98 % | HEART RATE: 61 BPM

## 2023-08-29 VITALS — DIASTOLIC BLOOD PRESSURE: 82 MMHG | SYSTOLIC BLOOD PRESSURE: 128 MMHG

## 2023-08-29 PROCEDURE — 93000 ELECTROCARDIOGRAM COMPLETE: CPT

## 2023-08-29 PROCEDURE — 99214 OFFICE O/P EST MOD 30 MIN: CPT | Mod: 25

## 2023-08-29 NOTE — HISTORY OF PRESENT ILLNESS
[FreeTextEntry1] : 78 y/o female with h/o htn, uterine ca s/p ARPITA/chemo/xrt, predm, cad, hl, overweight who presents for f/up today    last seen   never started zetia  no cp, sob, orthopnea, pnd, syncope, palpitations, edema, fatigue  notes she was unable to tolerate 2 statins due to side effects - lipitor and crestor    calcium score : score 25 (38%), mild ao calcification    Nuclear stress :  Impression:  Myocardial perfusion imaging is normal. Overall left ventricular systolic function is hyperdynamic without regional wall motion abnormalities. The EKG stress test is normal. Additionally, abnormal tracer uptake in the left axilla noted. Results discussed with referring MD.    Echo: : CONCLUSIONS:  1. Normal left and right ventricular size and systolic function.  2. Doppler findings are not conclusive but are suggestive of grade I diastolc dysfunction.  3. Aortic sclerosis without significant stenosis.  4. No evidence of pulmonary hypertension, pulmonary artery systolic pressure is 29 mmHg.  5. No pericardial effusion.  6. No prior echo is available for comparison.        had chemo x 6 sessions post hysterectomy      walk daily  diet healthy    HTN diagnosed in 40's    no pregnancy complications  no mental health issues      PMH/PSH:  cad  hl  predm  htn  uterine ca s/p ARPITA  - chemo/radiation  abdominoplasty   overweight  liposuction and fat transfer       MEDS:  asa 8 1mg qd  metoprolol succinate 50 mg qd  nifedipine 30 mg qd  lisinopril 20 mg qd  vit C/D    SH:  no tobacco  social etoh  no drugs  retired  was in admin/  single  2 children - 56, 57 - healthy   to 20 yr old  from [facility], raised [place] since age 2        ALL:  nkda      FH:  mother -  87 copd, htn, diabetes  father - unknown history  sister - alive, 74, heart disease/cva  3 siblings - 40's - etoh/drugs

## 2023-08-29 NOTE — DISCUSSION/SUMMARY
[Patient] : the patient [___ Month(s)] : in [unfilled] month(s) [FreeTextEntry1] : 76 y/o female with h/o htn, predm, cad, hl, uterine ca s/p ARPITA/chemo/xrt, overweight who presents for f/up today  -calcium score 11/21: score 25 (38%), mild ao calcification  -f/up with PCP about nuclear tracer uptake left axilla - she states evaluated and ok  -ekg ordered today - SR, lad, no st/t changes  -ekg 1/22 - SB, lafb, possible lvh, no st/t changes  -labs 2023 reviewed   -she will confirm 2 statins failed and attempt trial zetia now  -Nuclear stress 11/21: Impression:  Myocardial perfusion imaging is normal. Overall left ventricular systolic function is hyperdynamic without regional wall motion abnormalities. The EKG stress test is normal. Additionally, abnormal tracer uptake in the left axilla noted. Results discussed with referring MD.  -Echo: 11/21: CONCLUSIONS:   1. Normal left and right ventricular size and systolic function.   2. Doppler findings are not conclusive but are suggestive of grade I diastolc dysfunction.   3. Aortic sclerosis without significant stenosis.   4. No evidence of pulmonary hypertension, pulmonary artery systolic pressure is 29 mmHg.   5. No pericardial effusion.   6. No prior echo is available for comparison.  -continue BB, CCB, ACE, asa  -counseled on cvd risk factors  -monitor A1c for predm  -f/up 2 months for htn, lipids, A1c      I have spent 30 minutes reviewing labs, records, tests and discussed cvd risk factors, htn, cad .    [EKG obtained to assist in diagnosis and management of assessed problem(s)] : EKG obtained to assist in diagnosis and management of assessed problem(s)

## 2023-10-16 ENCOUNTER — RX RENEWAL (OUTPATIENT)
Age: 78
End: 2023-10-16

## 2023-10-16 RX ORDER — EZETIMIBE 10 MG/1
10 TABLET ORAL
Qty: 90 | Refills: 1 | Status: ACTIVE | COMMUNITY
Start: 2023-04-18 | End: 1900-01-01

## 2023-12-08 NOTE — ED PROVIDER NOTE - NS ED ATTENDING STATEMENT MOD
I have personally seen and examined the patient. I have collaborated with and supervised the Attending Only
